# Patient Record
Sex: MALE | Race: WHITE | NOT HISPANIC OR LATINO | Employment: OTHER | URBAN - METROPOLITAN AREA
[De-identification: names, ages, dates, MRNs, and addresses within clinical notes are randomized per-mention and may not be internally consistent; named-entity substitution may affect disease eponyms.]

---

## 2018-09-18 ENCOUNTER — OUTSIDE FACILITY SERVICE (OUTPATIENT)
Dept: GASTROENTEROLOGY | Facility: CLINIC | Age: 68
End: 2018-09-18

## 2018-09-18 ENCOUNTER — LAB REQUISITION (OUTPATIENT)
Dept: LAB | Facility: HOSPITAL | Age: 68
End: 2018-09-18

## 2018-09-18 DIAGNOSIS — Z12.11 ENCOUNTER FOR SCREENING FOR MALIGNANT NEOPLASM OF COLON: ICD-10-CM

## 2018-09-18 DIAGNOSIS — D12.4 BENIGN NEOPLASM OF DESCENDING COLON: ICD-10-CM

## 2018-09-18 DIAGNOSIS — D12.5 BENIGN NEOPLASM OF SIGMOID COLON: ICD-10-CM

## 2018-09-18 PROCEDURE — 45385 COLONOSCOPY W/LESION REMOVAL: CPT | Performed by: INTERNAL MEDICINE

## 2018-09-18 PROCEDURE — 88305 TISSUE EXAM BY PATHOLOGIST: CPT | Performed by: INTERNAL MEDICINE

## 2018-09-19 LAB
CYTO UR: NORMAL
LAB AP CASE REPORT: NORMAL
LAB AP CLINICAL INFORMATION: NORMAL
LAB AP DIAGNOSIS COMMENT: NORMAL
PATH REPORT.FINAL DX SPEC: NORMAL
PATH REPORT.GROSS SPEC: NORMAL

## 2018-10-01 ENCOUNTER — OFFICE VISIT (OUTPATIENT)
Dept: ORTHOPEDIC SURGERY | Facility: CLINIC | Age: 68
End: 2018-10-01

## 2018-10-01 VITALS — OXYGEN SATURATION: 98 % | HEIGHT: 69 IN | BODY MASS INDEX: 26.81 KG/M2 | WEIGHT: 181 LBS | HEART RATE: 70 BPM

## 2018-10-01 DIAGNOSIS — M79.672 LEFT FOOT PAIN: Primary | ICD-10-CM

## 2018-10-01 DIAGNOSIS — M21.6X2 ACQUIRED METATARSUS VARUS OF LEFT FOOT: ICD-10-CM

## 2018-10-01 DIAGNOSIS — G80.1 SPASTIC DIPLEGIA (HCC): ICD-10-CM

## 2018-10-01 DIAGNOSIS — M79.671 RIGHT FOOT PAIN: ICD-10-CM

## 2018-10-01 DIAGNOSIS — G62.9 NEUROPATHY: ICD-10-CM

## 2018-10-01 DIAGNOSIS — M24.575 CONTRACTURE OF JOINT OF FOOT, LEFT: ICD-10-CM

## 2018-10-01 PROCEDURE — 99204 OFFICE O/P NEW MOD 45 MIN: CPT | Performed by: ORTHOPAEDIC SURGERY

## 2018-10-01 RX ORDER — ROSUVASTATIN CALCIUM 5 MG/1
TABLET, COATED ORAL
COMMUNITY

## 2018-10-01 RX ORDER — LISINOPRIL 10 MG/1
10 TABLET ORAL DAILY
Refills: 0 | COMMUNITY
Start: 2018-08-07

## 2018-10-01 RX ORDER — UBIDECARENONE 100 MG
100 CAPSULE ORAL DAILY
COMMUNITY

## 2018-10-01 RX ORDER — LORATADINE 10 MG/1
10 TABLET ORAL DAILY
COMMUNITY
End: 2021-07-20

## 2018-10-01 NOTE — PROGRESS NOTES
NEW PATIENT    Patient: Ion Borges  : 1950    Primary Care Provider: Aurora Morales MD    Requesting Provider: As above    Pain of the Left Foot      History    Chief Complaint: Left foot pain    History of Present Illness: This is a 67-year-old gentleman who I last saw in .  He is here with increased pain in the left forefoot.  He has a complex neurologic history.  It's unclear however as 2 exactly the source of his problem.  He is a somewhat vague historian.  Sometime in the , he had surgery on his neck at C3 and C4.  He has an abnormal gait and spasticity lower greater than upper.  He is not certain if it started at the time of surgery, or if it got better after surgery, or if it stayed the same.  He does not think he has cerebral palsy.  He has not seen a neurologist in many years.  He is right-hand dominant, his hands and upper extremities have much more normal function on the lower extremities.  However he is hyperreflexic throughout.  He is here today because he has increased pain in the left foot.  He has a bunion and significant hammertoes 2 and 3.  He reports pain in all shoes.  He has difficulty finding anything to wear.  He rates pain as 2-6 out of 10, sharp and aching.  He has tried orthotics, he is tried toe spacers, he has tried various devices.    Current Outpatient Prescriptions on File Prior to Visit   Medication Sig Dispense Refill   • Sod Picosulfate-Mag Ox-Cit Acd 10-3.5-12 MG-GM -GM/160ML solution Take 1 kit by mouth Take As Directed. Follow instructions mailed to your home. If you didn't receive  Instructions, call  [phone]. 2 bottle 0     No current facility-administered medications on file prior to visit.       Allergies   Allergen Reactions   • Sulfur Other (See Comments)     Dizziness at age 13      History reviewed. No pertinent past medical history.  Past Surgical History:   Procedure Laterality Date   • BACK SURGERY     • HERNIA REPAIR      x2   • KNEE  "SURGERY Right     Meniscal sx   • SHOULDER SURGERY Left     Rotator Cuff     Family History   Problem Relation Age of Onset   • Stroke Father       Social History     Social History   • Marital status: Single     Spouse name: N/A   • Number of children: N/A   • Years of education: N/A     Occupational History   • Not on file.     Social History Main Topics   • Smoking status: Former Smoker     Packs/day: 2.00     Years: 11.00     Types: Cigarettes, Pipe     Start date: 1968     Quit date: 1980   • Smokeless tobacco: Never Used   • Alcohol use Yes      Comment: 0-3 daily   • Drug use: No   • Sexual activity: Defer     Other Topics Concern   • Not on file     Social History Narrative   • No narrative on file        Review of Systems   Constitutional: Negative.    HENT: Negative.    Eyes: Negative.    Respiratory: Negative.    Cardiovascular: Negative.    Gastrointestinal: Negative.    Endocrine: Negative.    Genitourinary: Negative.    Musculoskeletal: Positive for arthralgias and neck pain.   Skin: Negative.    Allergic/Immunologic: Positive for environmental allergies.   Neurological: Negative.    Hematological: Negative.    Psychiatric/Behavioral: Negative.        The following portions of the patient's history were reviewed and updated as appropriate: allergies, current medications, past family history, past medical history, past social history, past surgical history and problem list.    Physical Exam:   Pulse 70   Ht 174.6 cm (68.75\")   Wt 82.1 kg (181 lb)   SpO2 98%   BMI 26.92 kg/m²   GENERAL: Body habitus: overweight    Lower extremity edema: Left: trace; Right: trace    Varicose veins:  Left: mild; Right: mild    Gait: He walks with a somewhat crouched gait, left a little bit worse than right.  He has some flexion at the left knee and tends to slide that leg forward.  He does not have a heel toe gait on the left.  It's a little bit better on the right.     Mental Status:  awake and alert; oriented to " person, place, and time    Voice:  clear  SKIN:  warm and dry    Hair Growth:  Right:diminished; Left:  diminished  NAILS: Toenails: thick  HEENT: Head: Normocephalic, atraumatic,  without obvious abnormality.  eye: normal external eye, no icterus  ears: normal external ears  nose: normal external nose  pharynx: dental hygiene adequate  PULM:  Repiratory effort normal  CV:  Dorsalis Pedis:  Right: 2+; Left:2+    Posterior Tibial: Right:2+; Left:2+    Capillary Refill:  Brisk  MSK:  Hand:right handed and intrinsic wasting      Tibia:  Right:  non tender; Left:  non tender      Ankle:  Right: non tender and ROM  normal; Left:  non tender and He has less voluntary dorsiflexion on the left, he is able to dorsiflex it but tends to sit in an equina varus posture.      Foot:  Right:  Much milder hallux valgus metatarsus primus varus and hammertoes; Left:  Severe left hallux valgus metatarsus primus varus and crossover second and third toes.  The second and third toes have 90° flexion contractures at the PIP joints, they are dorsally dislocated at the metatarsal phalangeal joints and not reducible, they are tender to palpation.  Less deformity of toes 4 and 5.  Moderately tender over the bunion.  Fairly significant pronation of the great toe.      NEURO: Heel Walking:  Right:  Unable to do this; Left:  Unable to do this    Toe Walking:  Right:  Unable to do this; Left:  Unable to do this     [place] 5.07 monofilament test: patchy decrease    Lower extremity sensation: diminished     Reflexes:  Biceps:  Right:  4+; Left:  4+           Quads:  Right:  4+; Left:  4+           Ankle:  Right:  3+; Left:  3+      Calf Atrophy:none    Motor Function: He has better motor control in the right leg compared with the left, he has some trouble with voluntary dorsiflexion on the left, but I don't think it's due to weakness I think rather it's due to spasticity.  He has about 10-12 beats of clonus in both feet with dorsiflexion  of the ankles..         Medical Decision Making    Data Review:   ordered and reviewed x-rays today and phone conversation with physician    Assessment and Plan/ Diagnosis/Treatment options:   1. Contracture of joint of foot, left  He had increasing deformity in the left foot.  What I would do surgically is fuse the great toes stiff and straight, shortening the second and third metatarsals, excise the PIP joints, do metatarsal capsulotomies and extensor tendon lengthening, an open flexor tenotomies.  He will need to be nonweightbearing 10-12 weeks.  I explained to him the goal is stiffer straighter toes.  I cannot make them normal.  The goal is less pain.  However given that he has not had a neurologic evaluation in many years, and we do not know the source of his hyperreflexive nose, spasticity, clonus I think he needs to see a neurologist before we make a surgical plan.  He and I discussed this in detail.  He asked that Dr. Morales make the arrangements.  I spoke to her by phone and she will do that.  I will see him back when that evaluation is complete    2. Acquired metatarsus varus of left foot  As above    3. Spastic diplegia (CMS/HCC)/neuropathy  Significant neurologic abnormality, it looks like spastic diplegia, but it also could be some other upper motor neuron problem.  I will see him back when the neurologic evaluation is complete.

## 2018-10-22 ENCOUNTER — OFFICE VISIT (OUTPATIENT)
Dept: NEUROLOGY | Facility: CLINIC | Age: 68
End: 2018-10-22

## 2018-10-22 ENCOUNTER — LAB (OUTPATIENT)
Dept: LAB | Facility: HOSPITAL | Age: 68
End: 2018-10-22

## 2018-10-22 VITALS
OXYGEN SATURATION: 98 % | SYSTOLIC BLOOD PRESSURE: 122 MMHG | WEIGHT: 181 LBS | DIASTOLIC BLOOD PRESSURE: 78 MMHG | HEIGHT: 69 IN | BODY MASS INDEX: 26.81 KG/M2 | HEART RATE: 78 BPM

## 2018-10-22 DIAGNOSIS — G62.9 NEUROPATHY: ICD-10-CM

## 2018-10-22 DIAGNOSIS — R29.2 HYPERREFLEXIA OF LOWER EXTREMITY: Primary | ICD-10-CM

## 2018-10-22 DIAGNOSIS — D49.7 NEOPLASM OF UNSPECIFIED BEHAVIOR OF ENDOCRINE GLANDS AND OTHER PARTS OF NERVOUS SYSTEM: ICD-10-CM

## 2018-10-22 LAB
CK SERPL-CCNC: 195 U/L (ref 26–174)
FOLATE SERPL-MCNC: 5.83 NG/ML (ref 3.2–20)
HBA1C MFR BLD: 5.5 % (ref 4.8–5.6)
T4 FREE SERPL-MCNC: 1.24 NG/DL (ref 0.89–1.76)
TSH SERPL DL<=0.05 MIU/L-ACNC: 1.96 MIU/ML (ref 0.35–5.35)
VIT B12 BLD-MCNC: 270 PG/ML (ref 211–911)

## 2018-10-22 PROCEDURE — 84155 ASSAY OF PROTEIN SERUM: CPT

## 2018-10-22 PROCEDURE — 84165 PROTEIN E-PHORESIS SERUM: CPT

## 2018-10-22 PROCEDURE — 86038 ANTINUCLEAR ANTIBODIES: CPT

## 2018-10-22 PROCEDURE — 82550 ASSAY OF CK (CPK): CPT

## 2018-10-22 PROCEDURE — 83036 HEMOGLOBIN GLYCOSYLATED A1C: CPT

## 2018-10-22 PROCEDURE — 84439 ASSAY OF FREE THYROXINE: CPT

## 2018-10-22 PROCEDURE — 82607 VITAMIN B-12: CPT

## 2018-10-22 PROCEDURE — 84207 ASSAY OF VITAMIN B-6: CPT

## 2018-10-22 PROCEDURE — 82746 ASSAY OF FOLIC ACID SERUM: CPT

## 2018-10-22 PROCEDURE — 36415 COLL VENOUS BLD VENIPUNCTURE: CPT

## 2018-10-22 PROCEDURE — 84443 ASSAY THYROID STIM HORMONE: CPT

## 2018-10-22 PROCEDURE — 99204 OFFICE O/P NEW MOD 45 MIN: CPT | Performed by: PSYCHIATRY & NEUROLOGY

## 2018-10-22 RX ORDER — GUAIFENESIN 600 MG/1
1200 TABLET, EXTENDED RELEASE ORAL 2 TIMES DAILY
COMMUNITY
End: 2020-09-14

## 2018-10-22 NOTE — PROGRESS NOTES
Subjective:    CC: Ion Borges is seen today in consultation at the request of Aurora Morales MD for Spasticity       HPI:  68-year-old male with a history of hypertension, hyperlipidemia, recently diagnosed prostate cancer, cervical laminectomy was referred here by orthopedics for hyperreflexia on examination.  As per patient he had symptoms of tingling in both his arms along with dragging of his left leg and inability to have a penile erection back in 1980s.  After that he had an MRI of his cervical spine that had showed diffused vertebrae in his neck.  He postponed having surgery for several years but finally had a laminectomy at C3-4, C4-5 in 1990s.  After the surgery the tingling in his arms improved however he continued to drag his left leg and have sexual dysfunction.  He also started to have low back pain off and on about 10 years ago but he denies any radiation of the pain down his legs.  Denies any tingling or numbness in his feet but does feel extremely cold in his hands and feet and also has pain in the bottom of his feet on both sides that worsens with prolonged standing.  He was diagnosed with plantar fasciitis and was fitted with special insoles which relieved his pain initially but that has restarted again now.  He also has hammertoes and bunions in his left foot and recently saw an orthopedic surgeon who felt he was a good surgical candidate however since he had hyperreflexia in his lower extremities on examination  she referred him here.  He denies having a history of diabetes but does drink alcohol every day for the past several years (either 2 glasses of wine or 2-3 beers every day).    The following portions of the patient's history were reviewed today and updated as of 10/22/2018  : allergies, current medications, past family history, past medical history, past social history, past surgical history and problem list  These document will be scanned to patient's chart.      Current Outpatient  Prescriptions:   •  ALBUTEROL SULFATE IN, Inhale As Needed., Disp: , Rfl:   •  Ascorbic Acid (VITAMIN C PO), Take  by mouth Daily., Disp: , Rfl:   •  CBD (cannabidiol) oral oil, Take  by mouth Daily., Disp: , Rfl:   •  Cholecalciferol (VITAMIN D3 PO), Take  by mouth Daily., Disp: , Rfl:   •  CLOTRIMAZOLE EX, Apply  topically., Disp: , Rfl:   •  coenzyme Q10 100 MG capsule, Take 100 mg by mouth Daily., Disp: , Rfl:   •  guaiFENesin (MUCINEX) 600 MG 12 hr tablet, Take 1,200 mg by mouth 2 (Two) Times a Day., Disp: , Rfl:   •  Hyaluronan (ORTHOVISC) 30 MG/2ML solution prefilled syringe injection, Inject  into the appropriate joint as directed by provider 2 (Two) Times a Day., Disp: , Rfl:   •  lisinopril (PRINIVIL,ZESTRIL) 10 MG tablet, Take 10 mg by mouth Daily., Disp: , Rfl: 0  •  loratadine (CLARITIN) 10 MG tablet, Take 10 mg by mouth Daily., Disp: , Rfl:   •  Multiple Vitamin (MULTI-VITAMIN DAILY PO), Take  by mouth., Disp: , Rfl:   •  Omega-3 Fatty Acids (FISH OIL PO), Take  by mouth Daily., Disp: , Rfl:   •  rosuvastatin (CRESTOR) 5 MG tablet, rosuvastatin 5 mg tablet  Take 1 tablet every day by oral route., Disp: , Rfl:   •  Sod Picosulfate-Mag Ox-Cit Acd 10-3.5-12 MG-GM -GM/160ML solution, Take 1 kit by mouth Take As Directed. Follow instructions mailed to your home. If you didn't receive  Instructions, call  [phone]., Disp: 2 bottle, Rfl: 0   History reviewed. No pertinent past medical history.   Past Surgical History:   Procedure Laterality Date   • BACK SURGERY     • HERNIA REPAIR      x2   • KNEE SURGERY Right     Meniscal sx   • SHOULDER SURGERY Left     Rotator Cuff      Family History   Problem Relation Age of Onset   • Stroke Father       Social History     Social History   • Marital status: Single     Spouse name: N/A   • Number of children: N/A   • Years of education: N/A     Occupational History   • Not on file.     Social History Main Topics   • Smoking status: Former Smoker     Packs/day:  "2.00     Years: 11.00     Types: Cigarettes, Pipe     Start date: 1968     Quit date: 1980   • Smokeless tobacco: Never Used   • Alcohol use Yes      Comment: 0-3 daily   • Drug use: No   • Sexual activity: Defer     Other Topics Concern   • Not on file     Social History Narrative   • No narrative on file     Review of Systems   Respiratory: Positive for cough and shortness of breath.    Genitourinary: Positive for erectile dysfunction.   Musculoskeletal: Positive for gait problem, neck pain and neck stiffness.   Neurological: Positive for speech difficulty and weakness.   All other systems reviewed and are negative.      Objective:    /78 (BP Location: Right arm, Patient Position: Sitting, Cuff Size: Adult)   Pulse 78   Ht 174.6 cm (68.75\")   Wt 82.1 kg (181 lb)   SpO2 98%   BMI 26.92 kg/m²     Neurology Exam:    General apperance: NAD.     Mental status: Alert, awake and oriented to time place and person.    Recent and Remote memory: Intact.    Attention span and Concentration: Normal.     Language and Speech: Intact- No dysarthria.    Fluency, Naming , Repitition and Comprehension:  Intact    Cranial Nerves:   CN II: Visual fields are full. Intact. Fundi - Normal, No papillederma, Pupils - CHAIM  CN III, IV and VI: Extraocular movements are intact. Normal saccades.   CN V: Facial sensation is intact.   CN VII: Muscles of facial expression reveal no asymmetry. Intact.   CN VIII: Hearing is intact. Whispered voice intact.   CN IX and X: Palate elevates symmetrically. Intact  CN XI: Shoulder shrug is intact.   CN XII: Tongue is midline without evidence of atrophy or fasciculation.     Ophthalmoscopic exam of optic disc-normal    Motor:  Right UE muscle strength 5/5. Normal tone.     Left UE muscle strength 5/5. Normal tone.      Right LE muscle strength5/5. Normal tone.     Left LE muscle strength 5/5. Normal tone.  Left foot -bunion and hammer toes present    Sensory: Normal light touch and pinprick " sensation bilaterally.  Impaired vibration in both upper and lower extremities    DTRs: 2+ bilaterally in upper and 3+ in b/l lower extremities.  No clonus appreciated today     Babinski: Negative bilaterally.    Co-ordination: Normal finger-to-nose, heel to shin B/L.    Rhomberg: Negative.    Gait:  He did drag his left leg a little .  His left foot was also plantarflexed while walking    Cardiovascular: Regular rate and rhythm without murmur, gallop or rub.    Assessment and Plan:  1. Neuropathy  Based on his symptoms I feel he could have peripheral neuropathy secondary to long-standing daily alcohol use along with possible tarsal tunnel syndrome.  I will get an EMG/NCS to diagnose the above.  I will also get a neuropathy panel.  We will defer starting him on medications for now  - TONY; Future  - Hemoglobin A1c; Future  - Protein Elec + Interp, Serum; Future  - TSH+Free T4; Future  - Vitamin B12 & Folate; Future  - Vitamin B6; Future  - CK; Future  - EMG & Nerve Conduction Test; Future    2. Hyperreflexia of lower extremity  The hyperreflexia in his lower extremities could be long-standing changes in his cervical spine.  However there could be a concomitant lumbar spine pathology as he does complain of low back pain off and on.  I will get an MRI L spine as he has never had imaging of the lower back even when he had symptoms several years ago of the left leg dracking with penile dysfunction.  - MRI Lumbar Spine Without Contrast; Future       Return in about 5 weeks (around 11/26/2018).         Brook Huang MD

## 2018-10-23 LAB
ALBUMIN SERPL-MCNC: 3.9 G/DL (ref 2.9–4.4)
ALBUMIN/GLOB SERPL: 1.3 {RATIO} (ref 0.7–1.7)
ALPHA1 GLOB FLD ELPH-MCNC: 0.2 G/DL (ref 0–0.4)
ALPHA2 GLOB SERPL ELPH-MCNC: 0.8 G/DL (ref 0.4–1)
ANA SER QL: NEGATIVE
B-GLOBULIN SERPL ELPH-MCNC: 1.1 G/DL (ref 0.7–1.3)
GAMMA GLOB SERPL ELPH-MCNC: 0.8 G/DL (ref 0.4–1.8)
GLOBULIN SER CALC-MCNC: 2.9 G/DL (ref 2.2–3.9)
Lab: NORMAL
M-SPIKE: NORMAL G/DL
PROT PATTERN SERPL ELPH-IMP: NORMAL
PROT SERPL-MCNC: 6.8 G/DL (ref 6–8.5)

## 2018-10-25 LAB — VIT B6 SERPL-MCNC: 6.2 UG/L (ref 5.3–46.7)

## 2018-10-26 ENCOUNTER — TELEPHONE (OUTPATIENT)
Dept: NEUROLOGY | Facility: CLINIC | Age: 68
End: 2018-10-26

## 2018-10-26 NOTE — TELEPHONE ENCOUNTER
Informed patient of results per . Stated verbal understanding. Inquired about his EMG. No auth required per his insurance and CS should be calling soon to schedule this but provided pt with their phone # as well to get that set up.

## 2018-10-26 NOTE — TELEPHONE ENCOUNTER
----- Message from Brook Huang MD sent at 10/25/2018  4:54 PM EDT -----  Can you tell him that both his vitamin B6 and his B12 are on the lower limit of normal.  Hence I want him to take vitamin B6 100 mg daily and vitamin B12 1000 µg daily.  Other labs were fine.  His muscle enzyme was only slightly elevated.  Nothing needs to be done about that

## 2018-11-12 ENCOUNTER — HOSPITAL ENCOUNTER (OUTPATIENT)
Dept: MRI IMAGING | Facility: HOSPITAL | Age: 68
Discharge: HOME | End: 2018-11-12
Attending: PSYCHIATRY & NEUROLOGY | Admitting: PSYCHIATRY & NEUROLOGY

## 2018-11-12 DIAGNOSIS — R29.2 HYPERREFLEXIA OF LOWER EXTREMITY: ICD-10-CM

## 2018-11-12 PROCEDURE — 72148 MRI LUMBAR SPINE W/O DYE: CPT

## 2018-11-14 ENCOUNTER — TELEPHONE (OUTPATIENT)
Dept: NEUROLOGY | Facility: CLINIC | Age: 68
End: 2018-11-14

## 2018-11-27 ENCOUNTER — OFFICE VISIT (OUTPATIENT)
Dept: NEUROLOGY | Facility: CLINIC | Age: 68
End: 2018-11-27

## 2018-11-27 ENCOUNTER — HOSPITAL ENCOUNTER (OUTPATIENT)
Dept: NEUROLOGY | Facility: HOSPITAL | Age: 68
Discharge: HOME | End: 2018-11-27
Attending: PSYCHIATRY & NEUROLOGY | Admitting: PSYCHIATRY & NEUROLOGY

## 2018-11-27 VITALS
DIASTOLIC BLOOD PRESSURE: 76 MMHG | HEIGHT: 69 IN | WEIGHT: 181 LBS | BODY MASS INDEX: 26.81 KG/M2 | SYSTOLIC BLOOD PRESSURE: 120 MMHG

## 2018-11-27 DIAGNOSIS — G62.9 NEUROPATHY: ICD-10-CM

## 2018-11-27 DIAGNOSIS — G62.9 NEUROPATHY: Primary | ICD-10-CM

## 2018-11-27 PROCEDURE — 95886 MUSC TEST DONE W/N TEST COMP: CPT

## 2018-11-27 PROCEDURE — 95913 NRV CNDJ TEST 13/> STUDIES: CPT

## 2018-11-27 PROCEDURE — 99214 OFFICE O/P EST MOD 30 MIN: CPT | Performed by: PSYCHIATRY & NEUROLOGY

## 2018-11-27 RX ORDER — GABAPENTIN 300 MG/1
CAPSULE ORAL
Qty: 60 CAPSULE | Refills: 3 | Status: SHIPPED | OUTPATIENT
Start: 2018-11-27 | End: 2019-02-05 | Stop reason: SDUPTHER

## 2018-11-27 NOTE — PROGRESS NOTES
Subjective:    CC: Ion Borges is seen today  for hyperreflexia       HPI:  Current visit-Since his last visit patient continues to do the same and is currently denying any back pain radiating down to the legs.  He does have pain at the bottom of his feet more so with prolonged standing that is not well controlled with CBD oil.  He had all of his workup including the neuropathy panel showed that his B6 and B12 was on the lower limit of normal, other labs were normal.  I had asked him to take supplements which she is taking.  He also had his MRI L spine that I personally reviewed the results of which is as follows -Grade 1 anterolisthesis of L5 on S1. Multilevel spondylitic changes greatest at the L4-L5 and L5-S1 levels. At the L4-L5 level, there is right far lateral predominance of disc bulge along with significant facet hypertrophy producing moderate to severe spinal canal stenosis greatest in the right lateral region with moderate to severe right neuroforaminal stenosis and subarticular recess narrowing. At the L5-S1 level, there is moderate right and moderate to severe left neuroforaminal stenosis and left subarticular recess narrowing.  His EMG/NCS showed mild axonal peripheral neuropathy as well as multiple entrapment neuropathies including moderate left peroneal, mild to moderate bilateral ulnar and mild right median neuropathy.  As such there was no evidence of L5-S1 radiculopathy as the proximally innervated muscles in the legs were normal.  I have discussed the results of the MRI in detail with the patient however I feel he would not be a surgical candidate at this time as his back pain is mild with no radiation to the legs.  I did offer him a physical therapy referral but he has  been going for acupuncture for cervical radiculopathy and would like to try that first before trying PT.    Initial [id number] male with a history of hypertension, hyperlipidemia, recently diagnosed prostate cancer,  cervical laminectomy was referred here by orthopedics for hyperreflexia on examination.  As per patient he had symptoms of tingling in both his arms along with dragging of his left leg and inability to have a penile erection back in 1980s.  After that he had an MRI of his cervical spine that had showed diffused vertebrae in his neck.  He postponed having surgery for several years but finally had a laminectomy at C3-4, C4-5 in 1990s.  After the surgery the tingling in his arms improved however he continued to drag his left leg and have sexual dysfunction.  He also started to have low back pain off and on about 10 years ago but he denies any radiation of the pain down his legs.  Denies any tingling or numbness in his feet but does feel extremely cold in his hands and feet and also has pain in the bottom of his feet on both sides that worsens with prolonged standing.  He was diagnosed with plantar fasciitis and was fitted with special insoles which relieved his pain initially but that has restarted again now.  He also has hammertoes and bunions in his left foot and recently saw an orthopedic surgeon who felt he was a good surgical candidate however since he had hyperreflexia in his lower extremities on examination  she referred him here.  He denies having a history of diabetes but does drink alcohol every day for the past several years (either 2 glasses of wine or 2-3 beers every day).    The following portions of the patient's history were reviewed today and updated as of 11/27/2018  : allergies, current medications, past family history, past medical history, past social history, past surgical history and problem list  These document will be scanned to patient's chart.      Current Outpatient Medications:   •  ALBUTEROL SULFATE IN, Inhale As Needed., Disp: , Rfl:   •  Ascorbic Acid (VITAMIN C PO), Take  by mouth Daily., Disp: , Rfl:   •  CBD (cannabidiol) oral oil, Take  by mouth Daily., Disp: , Rfl:   •   Cholecalciferol (VITAMIN D3 PO), Take  by mouth Daily., Disp: , Rfl:   •  CLOTRIMAZOLE EX, Apply  topically., Disp: , Rfl:   •  coenzyme Q10 100 MG capsule, Take 100 mg by mouth Daily., Disp: , Rfl:   •  guaiFENesin (MUCINEX) 600 MG 12 hr tablet, Take 1,200 mg by mouth 2 (Two) Times a Day., Disp: , Rfl:   •  Hyaluronan (ORTHOVISC) 30 MG/2ML solution prefilled syringe injection, Inject  into the appropriate joint as directed by provider 2 (Two) Times a Day., Disp: , Rfl:   •  lisinopril (PRINIVIL,ZESTRIL) 10 MG tablet, Take 10 mg by mouth Daily., Disp: , Rfl: 0  •  loratadine (CLARITIN) 10 MG tablet, Take 10 mg by mouth Daily., Disp: , Rfl:   •  Multiple Vitamin (MULTI-VITAMIN DAILY PO), Take  by mouth., Disp: , Rfl:   •  Omega-3 Fatty Acids (FISH OIL PO), Take  by mouth Daily., Disp: , Rfl:   •  rosuvastatin (CRESTOR) 5 MG tablet, rosuvastatin 5 mg tablet  Take 1 tablet every day by oral route., Disp: , Rfl:   •  Sod Picosulfate-Mag Ox-Cit Acd 10-3.5-12 MG-GM -GM/160ML solution, Take 1 kit by mouth Take As Directed. Follow instructions mailed to your home. If you didn't receive  Instructions, call  [phone]., Disp: 2 bottle, Rfl: 0  •  gabapentin (NEURONTIN) 300 MG capsule, Take one capsule at night for 1 week then increase to 1 capsule twice a day thereafter, Disp: 60 capsule, Rfl: 3   History reviewed. No pertinent past medical history.   Past Surgical History:   Procedure Laterality Date   • BACK SURGERY     • HERNIA REPAIR      x2   • KNEE SURGERY Right     Meniscal sx   • SHOULDER SURGERY Left     Rotator Cuff      Family History   Problem Relation Age of Onset   • Stroke Father       Social History     Socioeconomic History   • Marital status:      Spouse name: Not on file   • Number of children: Not on file   • Years of education: Not on file   • Highest education level: Not on file   Social Needs   • Financial resource strain: Not on file   • Food insecurity - worry: Not on file   • Food  "insecurity - inability: Not on file   • Transportation needs - medical: Not on file   • Transportation needs - non-medical: Not on file   Occupational History   • Not on file   Tobacco Use   • Smoking status: Former Smoker     Packs/day: 2.00     Years: 11.00     Pack years: 22.00     Types: Cigarettes, Pipe     Start date:      Last attempt to quit: 1980     Years since quittin.9   • Smokeless tobacco: Never Used   Substance and Sexual Activity   • Alcohol use: Yes     Comment: 0-3 daily   • Drug use: No   • Sexual activity: Defer   Other Topics Concern   • Not on file   Social History Narrative   • Not on file     Review of Systems   Genitourinary: Positive for erectile dysfunction.   Musculoskeletal: Positive for arthralgias, back pain, gait problem, joint swelling, neck pain and neck stiffness.   All other systems reviewed and are negative.      Objective:    /76 (BP Location: Right arm, Patient Position: Sitting, Cuff Size: Adult)   Ht 174.6 cm (68.75\")   Wt 82.1 kg (181 lb)   BMI 26.92 kg/m²     Neurology Exam:    General apperance: NAD.     Mental status: Alert, awake and oriented to time place and person.    Recent and Remote memory: Intact.    Attention span and Concentration: Normal.     Language and Speech: Intact- No dysarthria.    Fluency, Naming , Repitition and Comprehension:  Intact    Cranial Nerves:   CN II: Visual fields are full. Intact. Fundi - Normal, No papillederma, Pupils - CHAIM  CN III, IV and VI: Extraocular movements are intact. Normal saccades.   CN V: Facial sensation is intact.   CN VII: Muscles of facial expression reveal no asymmetry. Intact.   CN VIII: Hearing is intact. Whispered voice intact.   CN IX and X: Palate elevates symmetrically. Intact  CN XI: Shoulder shrug is intact.   CN XII: Tongue is midline without evidence of atrophy or fasciculation.     Ophthalmoscopic exam of optic disc-normal    Motor:  Right UE muscle strength 5/5. Normal tone.     Left UE " muscle strength 5/5. Normal tone.      Right LE muscle strength5/5. Normal tone.     Left LE muscle strength 5/5.  Plantarflexion and dorsiflexion-5/5, Eversion and inversion-4/5, Normal tone.  Left foot -bunion and hammer toes present    Sensory: Normal light touch and pinprick sensation bilaterally.  Impaired vibration in both upper and lower extremities    DTRs: 2+ bilaterally in upper and 3+ in b/l lower extremities.  No clonus appreciated today     Babinski: Negative bilaterally.    Co-ordination: Normal finger-to-nose, heel to shin B/L.    Rhomberg: Negative.    Gait:  He did drag his left leg a little .  His left foot was also plantarflexed while walking  Cardiovascular: Regular rate and rhythm without murmur, gallop or rub.    Assessment and Plan:  1. Neuropathy  Patient has mild peripheral neuropathy most likely due to chronic alcohol use in addition to multiple entrapment neuropathies.  I have again counseled him to cut down on his alcohol intake.  I have also asked him to wear bilateral elbow braces especially at night.  I feel the left moderate peroneal neuropathy is chronic.  I will start him on gabapentin gradually increasing to 300 mg twice a day for the pain in his feet.   Regarding his MRI lumbar spine findings patient will let me know if he needs a physical therapy referral.  I do not think he will be a surgical candidate at this time as he denies having any severe symptoms.    Return in about 2 months (around 1/27/2019).     I spent over 20 minutes with the patient face to face out of which over 50% (10 minutes) was spent in management including reviewing his MRI and labs, instructions and education.     Brook Huang MD

## 2018-12-04 ENCOUNTER — APPOINTMENT (OUTPATIENT)
Dept: NEUROLOGY | Facility: HOSPITAL | Age: 68
End: 2018-12-04
Attending: PSYCHIATRY & NEUROLOGY

## 2019-01-02 ENCOUNTER — OFFICE VISIT (OUTPATIENT)
Dept: ORTHOPEDIC SURGERY | Facility: CLINIC | Age: 69
End: 2019-01-02

## 2019-01-02 VITALS — BODY MASS INDEX: 27.1 KG/M2 | HEIGHT: 69 IN | WEIGHT: 182.98 LBS | HEART RATE: 85 BPM | OXYGEN SATURATION: 98 %

## 2019-01-02 DIAGNOSIS — M21.962 DEFORMITY OF LEFT FOOT: Primary | ICD-10-CM

## 2019-01-02 PROCEDURE — 99214 OFFICE O/P EST MOD 30 MIN: CPT | Performed by: ORTHOPAEDIC SURGERY

## 2019-01-02 RX ORDER — CLOTRIMAZOLE 1 %
CREAM (GRAM) TOPICAL
Refills: 3 | COMMUNITY
Start: 2018-11-19 | End: 2020-04-23

## 2019-01-02 NOTE — PROGRESS NOTES
ESTABLISHED PATIENT    Patient: Ion Borges  : 1950    Primary Care Provider: Aurora Morales MD    Requesting Provider: As above    Follow-up of the Left Foot (Contracture of Joint of Foot Left/3 month f/u)      History    Chief Complaint: Left foot pain and deformity    History of Present Illness: He returns for follow-up of his neurologic evaluation.  The picture still is not very clear.  The EMGs showed neuropathy bilaterally, with more peroneal on the left.  Also some ulnar neuropathy.  I looked over the EMGs/NCB, as well as the neurology notes.  They did not identify the clonus that I found, and indicated motor function was 5 out of 5 which is not consistent with what I found.  In any event, they did not identify any progressive neurologic abnormality.  He would like to proceed with correction of the forefoot because of the severe deformity and pain     Current Outpatient Medications on File Prior to Visit   Medication Sig Dispense Refill   • ALBUTEROL SULFATE IN Inhale As Needed.     • Ascorbic Acid (VITAMIN C PO) Take  by mouth Daily.     • CBD (cannabidiol) oral oil Take  by mouth Daily.     • Cholecalciferol (VITAMIN D3 PO) Take  by mouth Daily.     • clotrimazole (LOTRIMIN) 1 % cream RAUL TO SOLES OF FEET AND WEBSPACES BID  FOR 2 WEEKS  3   • CLOTRIMAZOLE EX Apply  topically.     • coenzyme Q10 100 MG capsule Take 100 mg by mouth Daily.     • gabapentin (NEURONTIN) 300 MG capsule Take one capsule at night for 1 week then increase to 1 capsule twice a day thereafter 60 capsule 3   • guaiFENesin (MUCINEX) 600 MG 12 hr tablet Take 1,200 mg by mouth 2 (Two) Times a Day.     • Hyaluronan (ORTHOVISC) 30 MG/2ML solution prefilled syringe injection Inject  into the appropriate joint as directed by provider 2 (Two) Times a Day.     • lisinopril (PRINIVIL,ZESTRIL) 10 MG tablet Take 10 mg by mouth Daily.  0   • loratadine (CLARITIN) 10 MG tablet Take 10 mg by mouth Daily.     • Multiple Vitamin  (MULTI-VITAMIN DAILY PO) Take  by mouth.     • Omega-3 Fatty Acids (FISH OIL PO) Take  by mouth Daily.     • rosuvastatin (CRESTOR) 5 MG tablet rosuvastatin 5 mg tablet   Take 1 tablet every day by oral route.     • Sod Picosulfate-Mag Ox-Cit Acd 10-3.5-12 MG-GM -GM/160ML solution Take 1 kit by mouth Take As Directed. Follow instructions mailed to your home. If you didn't receive  Instructions, call  [phone]. 2 bottle 0     No current facility-administered medications on file prior to visit.       Allergies   Allergen Reactions   • Sulfur Other (See Comments)     Dizziness at age 13      History reviewed. No pertinent past medical history.  Past Surgical History:   Procedure Laterality Date   • BACK SURGERY     • HERNIA REPAIR      x2   • KNEE SURGERY Right     Meniscal sx   • SHOULDER SURGERY Left     Rotator Cuff     Family History   Problem Relation Age of Onset   • Stroke Father       Social History     Socioeconomic History   • Marital status:      Spouse name: Not on file   • Number of children: Not on file   • Years of education: Not on file   • Highest education level: Not on file   Social Needs   • Financial resource strain: Not on file   • Food insecurity - worry: Not on file   • Food insecurity - inability: Not on file   • Transportation needs - medical: Not on file   • Transportation needs - non-medical: Not on file   Occupational History   • Not on file   Tobacco Use   • Smoking status: Former Smoker     Packs/day: 2.00     Years: 11.00     Pack years: 22.00     Types: Cigarettes, Pipe     Start date:      Last attempt to quit: 1980     Years since quittin.0   • Smokeless tobacco: Never Used   Substance and Sexual Activity   • Alcohol use: Yes     Comment: 0-3 daily   • Drug use: No   • Sexual activity: Defer   Other Topics Concern   • Not on file   Social History Narrative   • Not on file        Review of Systems   Constitutional: Negative.    HENT: Negative.    Eyes: Negative.   "  Respiratory: Negative.    Cardiovascular: Negative.    Gastrointestinal: Negative.    Endocrine: Negative.    Genitourinary: Negative.    Musculoskeletal: Positive for joint swelling.   Skin: Negative.    Allergic/Immunologic: Negative.    Neurological: Negative.    Hematological: Negative.    Psychiatric/Behavioral: Negative.        The following portions of the patient's history were reviewed and updated as appropriate: allergies, current medications, past family history, past medical history, past social history, past surgical history and problem list.    Physical Exam:   Pulse 85   Ht 174.6 cm (68.74\")   Wt 83 kg (182 lb 15.7 oz)   SpO2 98%   BMI 27.23 kg/m²   GENERAL: Body habitus: overweight    Lower extremity edema: Left: trace; Right: trace    Gait: No change in his slightly crouched gait, sliding the foot on the left     Mental Status:  awake and alert; oriented to person, place, and time  MSK:  ; Left:  No change in severe left foot deformity with dorsally dislocated second and third metatarsal phalangeal joints, crossover toes, severe hallux valgus metatarsus primus varus, tender in toes 1 through 4, tender over the bunion, quite rigid deformity, fifth toe has reasonable alignment and is not tender    NEURO Sensation:  globally diminished,       Medical Decision Making    Data Review:   reviewed outside records    Assessment/Plan/Diagnosis/Treatment Options:   1. Deformity of left foot  The neurologic evaluations really did not clear up the picture completely.  However there is no obvious progressive neurologic problem.  Therefore I think it's reasonable to correct the foot.  I again explained to him what I would do.  I would fuse the first metatarsal phalangeal joint, do osteotomies of the second and third metatarsals.  For toes 2, 3, 4 I would do PIP joint excisions, metatarsal capsulotomy with extensor tendon lengthenings, and flexor tenotomies.  He will be nonweightbearing 12 weeks postop.  We " discussed using a knee scooter.  I recommend he practiced preoperatively.  After the 12 weeks he will be walking in a tall boot for 6-8 weeks.  I explained the  surgery.  I explained the stiffness in the toes, patients usually walk well after the surgery but they cannot go on tip-toe, crouch, wear heels, and they go down stairs flat-footed.  At most, they will be able to wear a 1 inch wedge heel.  The goal of the surgery is a stiffer straighter toes with less pain.  Most patients report about 80% reduction in pain.      I explained the permanent plate and screws, and the temporary pins and how I remove them in the office.   I explained that all foot and ankle surgery swells far longer than any other surgery because it is below the heart.     I explained the post-op regimen: 6-8 weeks non-weight bearing in a tall boot, then 6-8 weeks walking in a boot, with swelling it may be 4-5 months to get a shoe back on.  I explained it really takes a full year to see the end results of the surgery.    The risks were discussed including but not limited to: death, infection, neuro-vascular damage, stroke, heart attacks, blood clots, amputation, chronic pain, mal-union, non-union, hardware failure, deformity, need for further surgery, etc. Questions were asked and answered in detail.   We also discussed what would happen if we do nothing.     - Case Request; Standing  - ceFAZolin (ANCEF) 2 g in Sodium chloride 0.9 % 100 mL IVPB; Infuse 2 g into a venous catheter 1 (One) Time.  - CBC and Differential; Future  - Basic metabolic panel; Future  - Hemoglobin A1c; Future  - ECG 12 Lead; Future  - Case Request

## 2019-01-29 ENCOUNTER — OFFICE VISIT (OUTPATIENT)
Dept: NEUROLOGY | Facility: CLINIC | Age: 69
End: 2019-01-29

## 2019-01-29 VITALS
BODY MASS INDEX: 26.96 KG/M2 | SYSTOLIC BLOOD PRESSURE: 122 MMHG | OXYGEN SATURATION: 98 % | HEART RATE: 63 BPM | DIASTOLIC BLOOD PRESSURE: 74 MMHG | HEIGHT: 69 IN | WEIGHT: 182 LBS

## 2019-01-29 DIAGNOSIS — G62.9 NEUROPATHY: Primary | ICD-10-CM

## 2019-01-29 DIAGNOSIS — M54.50 CHRONIC BILATERAL LOW BACK PAIN WITHOUT SCIATICA: ICD-10-CM

## 2019-01-29 DIAGNOSIS — G89.29 CHRONIC BILATERAL LOW BACK PAIN WITHOUT SCIATICA: ICD-10-CM

## 2019-01-29 DIAGNOSIS — G56.23 ULNAR NEUROPATHY OF BOTH UPPER EXTREMITIES: ICD-10-CM

## 2019-01-29 PROBLEM — G56.20 ULNAR NEUROPATHY: Status: ACTIVE | Noted: 2019-01-29

## 2019-01-29 PROCEDURE — 99213 OFFICE O/P EST LOW 20 MIN: CPT | Performed by: PSYCHIATRY & NEUROLOGY

## 2019-01-29 NOTE — PROGRESS NOTES
Subjective:    CC: Ion Borges is seen today  for Peripheral Neuropathy       HPI:  Current visit-patient feels that the pain in his feet has improved after starting gabapentin 300 mg twice a day.  He does not want to go up on the dose any further yet.  He still has not had the surgery for his bunions and hammertoes and will be having it next month.  He continues to have mild low back pain and stiffness but no pain radiating down his legs.  With regards to his ulnar neuropathy he still has not started wearing an elbow brace.    Last visit- Since his last visit patient continues to do the same and is currently denying any back pain radiating down to the legs.  He does have pain at the bottom of his feet more so with prolonged standing that is not well controlled with CBD oil.  He had all of his workup including the neuropathy panel showed that his B6 and B12 was on the lower limit of normal, other labs were normal.  I had asked him to take supplements which she is taking.  He also had his MRI L spine that I personally reviewed the results of which is as follows -Grade 1 anterolisthesis of L5 on S1. Multilevel spondylitic changes greatest at the L4-L5 and L5-S1 levels. At the L4-L5 level, there is right far lateral predominance of disc bulge along with significant facet hypertrophy producing moderate to severe spinal canal stenosis greatest in the right lateral region with moderate to severe right neuroforaminal stenosis and subarticular recess narrowing. At the L5-S1 level, there is moderate right and moderate to severe left neuroforaminal stenosis and left subarticular recess narrowing.  His EMG/NCS showed mild axonal peripheral neuropathy as well as multiple entrapment neuropathies including moderate left peroneal, mild to moderate bilateral ulnar and mild right median neuropathy.  As such there was no evidence of L5-S1 radiculopathy as the proximally innervated muscles in the legs were normal.  I have  discussed the results of the MRI in detail with the patient however I feel he would not be a surgical candidate at this time as his back pain is mild with no radiation to the legs.  I did offer him a physical therapy referral but he has  been going for acupuncture for cervical radiculopathy and would like to try that first before trying PT.     Initial [id number]-old male with a history of hypertension, hyperlipidemia, recently diagnosed prostate cancer, cervical laminectomy was referred here by orthopedics for hyperreflexia on examination.  As per patient he had symptoms of tingling in both his arms along with dragging of his left leg and inability to have a penile erection back in 1980s.  After that he had an MRI of his cervical spine that had showed diffused vertebrae in his neck.  He postponed having surgery for several years but finally had a laminectomy at C3-4, C4-5 in 1990s.  After the surgery the tingling in his arms improved however he continued to drag his left leg and have sexual dysfunction.  He also started to have low back pain off and on about 10 years ago but he denies any radiation of the pain down his legs.  Denies any tingling or numbness in his feet but does feel extremely cold in his hands and feet and also has pain in the bottom of his feet on both sides that worsens with prolonged standing.  He was diagnosed with plantar fasciitis and was fitted with special insoles which relieved his pain initially but that has restarted again now.  He also has hammertoes and bunions in his left foot and recently saw an orthopedic surgeon who felt he was a good surgical candidate however since he had hyperreflexia in his lower extremities on examination  she referred him here.  He denies having a history of diabetes but does drink alcohol every day for the past several years (either 2 glasses of wine or 2-3 beers every day).    The following portions of the patient's history were reviewed today and  updated as of 01/29/2019  : allergies, current medications, past family history, past medical history, past social history, past surgical history and problem list  These document will be scanned to patient's chart.      Current Outpatient Medications:   •  ALBUTEROL SULFATE IN, Inhale As Needed., Disp: , Rfl:   •  Ascorbic Acid (VITAMIN C PO), Take  by mouth Daily., Disp: , Rfl:   •  CBD (cannabidiol) oral oil, Take  by mouth Daily., Disp: , Rfl:   •  Cholecalciferol (VITAMIN D3 PO), Take  by mouth Daily., Disp: , Rfl:   •  clotrimazole (LOTRIMIN) 1 % cream, RAUL TO SOLES OF FEET AND WEBSPACES BID  FOR 2 WEEKS, Disp: , Rfl: 3  •  CLOTRIMAZOLE EX, Apply  topically., Disp: , Rfl:   •  coenzyme Q10 100 MG capsule, Take 100 mg by mouth Daily., Disp: , Rfl:   •  gabapentin (NEURONTIN) 300 MG capsule, Take one capsule at night for 1 week then increase to 1 capsule twice a day thereafter, Disp: 60 capsule, Rfl: 3  •  guaiFENesin (MUCINEX) 600 MG 12 hr tablet, Take 1,200 mg by mouth 2 (Two) Times a Day., Disp: , Rfl:   •  Hyaluronan (ORTHOVISC) 30 MG/2ML solution prefilled syringe injection, Inject  into the appropriate joint as directed by provider 2 (Two) Times a Day., Disp: , Rfl:   •  lisinopril (PRINIVIL,ZESTRIL) 10 MG tablet, Take 10 mg by mouth Daily., Disp: , Rfl: 0  •  loratadine (CLARITIN) 10 MG tablet, Take 10 mg by mouth Daily., Disp: , Rfl:   •  Multiple Vitamin (MULTI-VITAMIN DAILY PO), Take  by mouth., Disp: , Rfl:   •  Omega-3 Fatty Acids (FISH OIL PO), Take  by mouth Daily., Disp: , Rfl:   •  rosuvastatin (CRESTOR) 5 MG tablet, rosuvastatin 5 mg tablet  Take 1 tablet every day by oral route., Disp: , Rfl:   •  Sod Picosulfate-Mag Ox-Cit Acd 10-3.5-12 MG-GM -GM/160ML solution, Take 1 kit by mouth Take As Directed. Follow instructions mailed to your home. If you didn't receive  Instructions, call  [phone]., Disp: 2 bottle, Rfl: 0   History reviewed. No pertinent past medical history.   Past Surgical  "History:   Procedure Laterality Date   • BACK SURGERY     • HERNIA REPAIR      x2   • KNEE SURGERY Right     Meniscal sx   • SHOULDER SURGERY Left     Rotator Cuff      Family History   Problem Relation Age of Onset   • Stroke Father       Social History     Socioeconomic History   • Marital status:      Spouse name: Not on file   • Number of children: Not on file   • Years of education: Not on file   • Highest education level: Not on file   Social Needs   • Financial resource strain: Not on file   • Food insecurity - worry: Not on file   • Food insecurity - inability: Not on file   • Transportation needs - medical: Not on file   • Transportation needs - non-medical: Not on file   Occupational History   • Not on file   Tobacco Use   • Smoking status: Former Smoker     Packs/day: 2.00     Years: 11.00     Pack years: 22.00     Types: Cigarettes, Pipe     Start date:      Last attempt to quit:      Years since quittin.1   • Smokeless tobacco: Never Used   Substance and Sexual Activity   • Alcohol use: Yes     Comment: 0-3 daily   • Drug use: No   • Sexual activity: Defer   Other Topics Concern   • Not on file   Social History Narrative   • Not on file     Review of Systems   HENT: Positive for rhinorrhea.    Respiratory: Positive for cough.    Musculoskeletal: Positive for gait problem, neck pain and neck stiffness.   Neurological: Positive for numbness.   All other systems reviewed and are negative.      Objective:    /74 (BP Location: Right arm, Patient Position: Sitting, Cuff Size: Adult)   Pulse 63   Ht 174.6 cm (68.75\")   Wt 82.6 kg (182 lb)   SpO2 98%   BMI 27.07 kg/m²     Neurology Exam:    General apperance: NAD.     Mental status: Alert, awake and oriented to time place and person.    Recent and Remote memory: Intact.    Attention span and Concentration: Normal.     Language and Speech: Intact- No dysarthria.    Fluency, Naming , Repitition and Comprehension:  Intact    Cranial " Nerves:   CN II: Visual fields are full. Intact. Fundi - Normal, No papillederma, Pupils - CHAIM  CN III, IV and VI: Extraocular movements are intact. Normal saccades.   CN V: Facial sensation is intact.   CN VII: Muscles of facial expression reveal no asymmetry. Intact.   CN VIII: Hearing is intact. Whispered voice intact.   CN IX and X: Palate elevates symmetrically. Intact  CN XI: Shoulder shrug is intact.   CN XII: Tongue is midline without evidence of atrophy or fasciculation.     Ophthalmoscopic exam of optic disc-normal    Motor:  Right UE muscle strength 5/5. Normal tone.     Left UE muscle strength 5/5. Normal tone.      Right LE muscle strength5/5. Normal tone.       Left LE muscle strength 5/5.  Plantarflexion and dorsiflexion-5/5, Eversion and inversion-4/5, Normal tone.  Left foot -bunion and hammer toes present    Sensory: Normal light touch and pinprick sensation bilaterally.  Impaired vibration in both upper and lower extremities    DTRs: 2+ bilaterally in upper and 3+ in b/l lower extremities.  No clonus appreciated today     Babinski: Negative bilaterally.    Co-ordination: Normal finger-to-nose, heel to shin B/L.    Rhomberg: Negative.    Gait:  He did drag his left leg a little .  His left foot was also plantarflexed while walking    Cardiovascular: Regular rate and rhythm without murmur, gallop or rub.    Assessment and Plan:  1. Neuropathy  Peripheral neuropathy most likely due to chronic alcohol use.  He has cut down his intake to no more than once a week.  He also has multiple entrapment neuropathies including bilateral ulnar, left peroneal and right median neuropathy.  I have asked him to continue gabapentin 300 mg twice a day for now    2. Chronic bilateral low back pain without sciatica  He will get physical therapy for this as advised.  If needed I will give him a referral    3. Ulnar neuropathy of both upper extremities  I have again advised him to wear bilateral elbow splints especially  at night       Return in about 4 months (around 5/29/2019).         Brook Huang MD

## 2019-02-05 RX ORDER — GABAPENTIN 300 MG/1
CAPSULE ORAL
Qty: 180 CAPSULE | Refills: 1 | Status: SHIPPED | OUTPATIENT
Start: 2019-02-05 | End: 2019-05-14 | Stop reason: SDUPTHER

## 2019-02-18 ENCOUNTER — TELEPHONE (OUTPATIENT)
Dept: ORTHOPEDIC SURGERY | Facility: CLINIC | Age: 69
End: 2019-02-18

## 2019-02-18 NOTE — TELEPHONE ENCOUNTER
PATIENT CALLED TO CANCEL SURGERY ON 4/30 WITH DR. IZAGUIRRE.  STATED HE HAS DECIDED TO GO IN ANOTHER DIRECTION.

## 2019-05-14 ENCOUNTER — OFFICE VISIT (OUTPATIENT)
Dept: NEUROLOGY | Facility: CLINIC | Age: 69
End: 2019-05-14

## 2019-05-14 VITALS
HEART RATE: 74 BPM | SYSTOLIC BLOOD PRESSURE: 124 MMHG | BODY MASS INDEX: 26.96 KG/M2 | HEIGHT: 69 IN | DIASTOLIC BLOOD PRESSURE: 76 MMHG | OXYGEN SATURATION: 98 % | WEIGHT: 182 LBS

## 2019-05-14 DIAGNOSIS — M54.50 CHRONIC BILATERAL LOW BACK PAIN WITHOUT SCIATICA: ICD-10-CM

## 2019-05-14 DIAGNOSIS — G56.23 ULNAR NEUROPATHY OF BOTH UPPER EXTREMITIES: ICD-10-CM

## 2019-05-14 DIAGNOSIS — G89.29 CHRONIC BILATERAL LOW BACK PAIN WITHOUT SCIATICA: ICD-10-CM

## 2019-05-14 DIAGNOSIS — G62.9 NEUROPATHY: Primary | ICD-10-CM

## 2019-05-14 PROCEDURE — 99214 OFFICE O/P EST MOD 30 MIN: CPT | Performed by: PSYCHIATRY & NEUROLOGY

## 2019-05-14 RX ORDER — GABAPENTIN 300 MG/1
CAPSULE ORAL
Qty: 90 CAPSULE | Refills: 5 | Status: SHIPPED | OUTPATIENT
Start: 2019-05-14 | End: 2019-09-26

## 2019-05-14 NOTE — PROGRESS NOTES
Subjective:    CC: Ion Borges is seen today  for Peripheral Neuropathy       HPI:  Current visit- since his last visit patient has had surgery for his hammertoes but not for the bunions.  The pain in his feet has improved but he still gets burning pain in the balls of his feet with prolonged standing at work.  His low back pain has also improved as he has been getting physical therapy.  With regards to his ulnar neuropathy he typically wears the elbow splints only on one side.    patient feels that the pain in his feet has improved after starting gabapentin 300 mg twice a day.  He does not want to go up on the dose any further yet.  He still has not had the surgery for his bunions and hammertoes and will be having it next month.  He continues to have mild low back pain and stiffness but no pain radiating down his legs.  With regards to his ulnar neuropathy he still has not started wearing an elbow brace.     Last visit- Since his last visit patient continues to do the same and is currently denying any back pain radiating down to the legs.  He does have pain at the bottom of his feet more so with prolonged standing that is not well controlled with CBD oil.  He had all of his workup including the neuropathy panel showed that his B6 and B12 was on the lower limit of normal, other labs were normal.  I had asked him to take supplements which she is taking.  He also had his MRI L spine that I personally reviewed the results of which is as follows -Grade 1 anterolisthesis of L5 on S1. Multilevel spondylitic changes greatest at the L4-L5 and L5-S1 levels. At the L4-L5 level, there is right far lateral predominance of disc bulge along with significant facet hypertrophy producing moderate to severe spinal canal stenosis greatest in the right lateral region with moderate to severe right neuroforaminal stenosis and subarticular recess narrowing. At the L5-S1 level, there is moderate right and moderate to severe left  neuroforaminal stenosis and left subarticular recess narrowing.  His EMG/NCS showed mild axonal peripheral neuropathy as well as multiple entrapment neuropathies including moderate left peroneal, mild to moderate bilateral ulnar and mild right median neuropathy.  As such there was no evidence of L5-S1 radiculopathy as the proximally innervated muscles in the legs were normal.  I have discussed the results of the MRI in detail with the patient however I feel he would not be a surgical candidate at this time as his back pain is mild with no radiation to the legs.  I did offer him a physical therapy referral but he has  been going for acupuncture for cervical radiculopathy and would like to try that first before trying PT.     Initial [id number]-old male with a history of hypertension, hyperlipidemia, recently diagnosed prostate cancer, cervical laminectomy was referred here by orthopedics for hyperreflexia on examination.  As per patient he had symptoms of tingling in both his arms along with dragging of his left leg and inability to have a penile erection back in 1980s.  After that he had an MRI of his cervical spine that had showed diffused vertebrae in his neck.  He postponed having surgery for several years but finally had a laminectomy at C3-4, C4-5 in 1990s.  After the surgery the tingling in his arms improved however he continued to drag his left leg and have sexual dysfunction.  He also started to have low back pain off and on about 10 years ago but he denies any radiation of the pain down his legs.  Denies any tingling or numbness in his feet but does feel extremely cold in his hands and feet and also has pain in the bottom of his feet on both sides that worsens with prolonged standing.  He was diagnosed with plantar fasciitis and was fitted with special insoles which relieved his pain initially but that has restarted again now.  He also has hammertoes and bunions in his left foot and recently saw an  orthopedic surgeon who felt he was a good surgical candidate however since he had hyperreflexia in his lower extremities on examination  she referred him here.  He denies having a history of diabetes but does drink alcohol every day for the past several years (either 2 glasses of wine or 2-3 beers every day).    The following portions of the patient's history were reviewed today and updated as of 05/14/2019  : allergies, current medications, past family history, past medical history, past social history, past surgical history and problem list  These document will be scanned to patient's chart.      Current Outpatient Medications:   •  ALBUTEROL SULFATE IN, Inhale As Needed., Disp: , Rfl:   •  Ascorbic Acid (VITAMIN C PO), Take  by mouth Daily., Disp: , Rfl:   •  CBD (cannabidiol) oral oil, Take  by mouth Daily., Disp: , Rfl:   •  Cholecalciferol (VITAMIN D3 PO), Take  by mouth Daily., Disp: , Rfl:   •  clotrimazole (LOTRIMIN) 1 % cream, RAUL TO SOLES OF FEET AND WEBSPACES BID  FOR 2 WEEKS, Disp: , Rfl: 3  •  CLOTRIMAZOLE EX, Apply  topically., Disp: , Rfl:   •  coenzyme Q10 100 MG capsule, Take 100 mg by mouth Daily., Disp: , Rfl:   •  gabapentin (NEURONTIN) 300 MG capsule, Take one capsule three times a day, Disp: 90 capsule, Rfl: 5  •  guaiFENesin (MUCINEX) 600 MG 12 hr tablet, Take 1,200 mg by mouth 2 (Two) Times a Day., Disp: , Rfl:   •  Hyaluronan (ORTHOVISC) 30 MG/2ML solution prefilled syringe injection, Inject  into the appropriate joint as directed by provider 2 (Two) Times a Day., Disp: , Rfl:   •  lisinopril (PRINIVIL,ZESTRIL) 10 MG tablet, Take 10 mg by mouth Daily., Disp: , Rfl: 0  •  loratadine (CLARITIN) 10 MG tablet, Take 10 mg by mouth Daily., Disp: , Rfl:   •  Multiple Vitamin (MULTI-VITAMIN DAILY PO), Take  by mouth., Disp: , Rfl:   •  Omega-3 Fatty Acids (FISH OIL PO), Take  by mouth Daily., Disp: , Rfl:   •  rosuvastatin (CRESTOR) 5 MG tablet, rosuvastatin 5 mg tablet  Take 1 tablet every day by  "oral route., Disp: , Rfl:   •  Sod Picosulfate-Mag Ox-Cit Acd 10-3.5-12 MG-GM -GM/160ML solution, Take 1 kit by mouth Take As Directed. Follow instructions mailed to your home. If you didn't receive  Instructions, call  [phone]., Disp: 2 bottle, Rfl: 0   History reviewed. No pertinent past medical history.   Past Surgical History:   Procedure Laterality Date   • BACK SURGERY     • FOOT SURGERY      toe removed on left foot    • HERNIA REPAIR      x2   • KNEE SURGERY Right     Meniscal sx   • SHOULDER SURGERY Left     Rotator Cuff      Family History   Problem Relation Age of Onset   • Stroke Father       Social History     Socioeconomic History   • Marital status:      Spouse name: Not on file   • Number of children: Not on file   • Years of education: Not on file   • Highest education level: Not on file   Tobacco Use   • Smoking status: Former Smoker     Packs/day: 2.00     Years: 11.00     Pack years: 22.00     Types: Cigarettes, Pipe     Start date:      Last attempt to quit:      Years since quittin.3   • Smokeless tobacco: Never Used   Substance and Sexual Activity   • Alcohol use: Yes     Comment: 0-3 daily   • Drug use: No   • Sexual activity: Defer     Review of Systems   Eyes: Positive for discharge.   Musculoskeletal: Positive for neck pain and neck stiffness.   All other systems reviewed and are negative.      Objective:    /76 (BP Location: Right arm, Patient Position: Sitting, Cuff Size: Adult)   Pulse 74   Ht 174.6 cm (68.75\")   Wt 82.6 kg (182 lb)   SpO2 98%   BMI 27.07 kg/m²     Neurology Exam:    General apperance: NAD.     Mental status: Alert, awake and oriented to time place and person.    Recent and Remote memory: Intact.    Attention span and Concentration: Normal.     Language and Speech: Intact- No dysarthria.    Fluency, Naming , Repitition and Comprehension:  Intact    Cranial Nerves:   CN II: Visual fields are full. Intact. Fundi - Normal, No " papillederma, Pupils - CHAIM  CN III, IV and VI: Extraocular movements are intact. Normal saccades.   CN V: Facial sensation is intact.   CN VII: Muscles of facial expression reveal no asymmetry. Intact.   CN VIII: Hearing is intact. Whispered voice intact.   CN IX and X: Palate elevates symmetrically. Intact  CN XI: Shoulder shrug is intact.   CN XII: Tongue is midline without evidence of atrophy or fasciculation.     Ophthalmoscopic exam of optic disc-normal    Motor:  Right UE muscle strength 5/5. Normal tone.     Left UE muscle strength 5/5. Normal tone.      Right LE muscle strength5/5. Normal tone.     Left LE muscle strength 5/5.  Plantarflexion and dorsiflexion-5/5, Eversion and inversion-4/5, Normal tone.      Sensory: Normal light touch and pinprick sensation bilaterally.  Impaired vibration in both upper and lower extremities    DTRs: 2+ bilaterally in upper and 3+ in b/l lower extremities.  No clonus     Babinski: Negative bilaterally.    Co-ordination: Normal finger-to-nose, heel to shin B/L.    Rhomberg: Negative.    Gait:  He did drag his left leg a little .  His left foot was also plantarflexed while walking      Cardiovascular: Regular rate and rhythm without murmur, gallop or rub.    Assessment and Plan:  1. Neuropathy  Could be from chronic alcohol use which he has now cut down on along with the deformities in his foot.  He also has multiple entrapment neuropathies including bilateral ulnar, left peroneal and right median neuropathy.  I will increase his gabapentin to 300 mg 3 times a day    2. Chronic bilateral low back pain without sciatica  Well-controlled with physical therapy    3. Ulnar neuropathy of both upper extremities  I have again counseled him to wear bilateral elbow splints       Return in about 5 months (around 10/14/2019).     I spent over 25  minutes with the patient face to face out of which over 50% (15 minutes) was spent in management, instructions and education regarding his  ulnar neuropathy.     Brook Huang MD

## 2019-07-16 ENCOUNTER — HOSPITAL ENCOUNTER (OUTPATIENT)
Dept: GENERAL RADIOLOGY | Facility: HOSPITAL | Age: 69
Discharge: HOME | End: 2019-07-16
Admitting: INTERNAL MEDICINE

## 2019-07-16 ENCOUNTER — TRANSCRIBE ORDERS (OUTPATIENT)
Dept: ADMINISTRATIVE | Facility: HOSPITAL | Age: 69
End: 2019-07-16

## 2019-07-16 DIAGNOSIS — R52 PAIN: Primary | ICD-10-CM

## 2019-07-16 DIAGNOSIS — T14.90XA TRAUMA: ICD-10-CM

## 2019-07-16 PROCEDURE — 73560 X-RAY EXAM OF KNEE 1 OR 2: CPT

## 2019-08-13 ENCOUNTER — TRANSCRIBE ORDERS (OUTPATIENT)
Dept: ADMINISTRATIVE | Facility: HOSPITAL | Age: 69
End: 2019-08-13

## 2019-08-13 DIAGNOSIS — E21.3 HYPERPARATHYROIDISM (HCC): Primary | ICD-10-CM

## 2019-09-24 ENCOUNTER — OFFICE VISIT (OUTPATIENT)
Dept: NEUROLOGY | Facility: CLINIC | Age: 69
End: 2019-09-24

## 2019-09-24 ENCOUNTER — TELEPHONE (OUTPATIENT)
Dept: NEUROLOGY | Facility: CLINIC | Age: 69
End: 2019-09-24

## 2019-09-24 VITALS
OXYGEN SATURATION: 99 % | WEIGHT: 183 LBS | HEART RATE: 72 BPM | DIASTOLIC BLOOD PRESSURE: 60 MMHG | BODY MASS INDEX: 27.11 KG/M2 | HEIGHT: 69 IN | SYSTOLIC BLOOD PRESSURE: 108 MMHG

## 2019-09-24 DIAGNOSIS — G89.29 CHRONIC BILATERAL LOW BACK PAIN WITHOUT SCIATICA: ICD-10-CM

## 2019-09-24 DIAGNOSIS — G62.9 NEUROPATHY: Primary | ICD-10-CM

## 2019-09-24 DIAGNOSIS — G56.23 ULNAR NEUROPATHY OF BOTH UPPER EXTREMITIES: ICD-10-CM

## 2019-09-24 DIAGNOSIS — M54.50 CHRONIC BILATERAL LOW BACK PAIN WITHOUT SCIATICA: ICD-10-CM

## 2019-09-24 PROCEDURE — 99213 OFFICE O/P EST LOW 20 MIN: CPT | Performed by: PSYCHIATRY & NEUROLOGY

## 2019-09-24 RX ORDER — ASPIRIN 81 MG/1
81 TABLET ORAL DAILY
COMMUNITY

## 2019-09-25 ENCOUNTER — TELEPHONE (OUTPATIENT)
Dept: NEUROLOGY | Facility: CLINIC | Age: 69
End: 2019-09-25

## 2019-09-25 NOTE — TELEPHONE ENCOUNTER
Can you ask him where he needs the prescription to be sent because at his last appointment he told me that he will let me know

## 2019-09-25 NOTE — TELEPHONE ENCOUNTER
----- Message from Martha Blancas sent at 9/25/2019 10:50 AM EDT -----  Contact: Gonzalo Huang Pt called requesting a refill on RX gabapentin (NEURONTIN) 300 MG .

## 2019-09-26 RX ORDER — GABAPENTIN 300 MG/1
300 CAPSULE ORAL 3 TIMES DAILY
Qty: 90 CAPSULE | Refills: 3 | Status: SHIPPED | OUTPATIENT
Start: 2019-09-26 | End: 2020-02-20 | Stop reason: SDUPTHER

## 2019-10-22 ENCOUNTER — APPOINTMENT (OUTPATIENT)
Dept: BONE DENSITY | Facility: HOSPITAL | Age: 69
End: 2019-10-22

## 2019-12-04 ENCOUNTER — TELEPHONE (OUTPATIENT)
Dept: NEUROLOGY | Facility: CLINIC | Age: 69
End: 2019-12-04

## 2019-12-04 NOTE — TELEPHONE ENCOUNTER
Pt called and stated that his work description is changing and he would now be required to life 40-50 pounds.  He wants a letter that states that he can only lift 20-25 pounds.   Please advise.

## 2019-12-13 ENCOUNTER — TELEPHONE (OUTPATIENT)
Dept: NEUROLOGY | Facility: CLINIC | Age: 69
End: 2019-12-13

## 2019-12-13 NOTE — TELEPHONE ENCOUNTER
----- Message from Tran Lenz, [employer] Rep sent at 12/13/2019 11:28 AM EST -----  Contact: PT  Reina    PT called, was wondering if his work restrictions letter had been mailed out. It looks as though Sada typed and mailed the letter, and followed up with PT.     Please call Ion back: [phone], would like whoever calls to [facility] if he doesn't answer.

## 2019-12-19 NOTE — TELEPHONE ENCOUNTER
Pt left vm asking for a call back. Pt states he never received his letter in the mail, but can come by today around 1 or 1:30, but asks for a call back.

## 2020-02-20 DIAGNOSIS — G62.9 NEUROPATHY: Primary | ICD-10-CM

## 2020-02-21 RX ORDER — GABAPENTIN 300 MG/1
300 CAPSULE ORAL 3 TIMES DAILY
Qty: 90 CAPSULE | Refills: 3 | Status: SHIPPED | OUTPATIENT
Start: 2020-02-21 | End: 2020-07-06 | Stop reason: SDUPTHER

## 2020-04-23 ENCOUNTER — TELEMEDICINE (OUTPATIENT)
Dept: NEUROLOGY | Facility: CLINIC | Age: 70
End: 2020-04-23

## 2020-04-23 DIAGNOSIS — G89.29 CHRONIC BILATERAL LOW BACK PAIN WITHOUT SCIATICA: ICD-10-CM

## 2020-04-23 DIAGNOSIS — G62.9 NEUROPATHY: Primary | ICD-10-CM

## 2020-04-23 DIAGNOSIS — G56.23 ULNAR NEUROPATHY OF BOTH UPPER EXTREMITIES: ICD-10-CM

## 2020-04-23 DIAGNOSIS — M54.50 CHRONIC BILATERAL LOW BACK PAIN WITHOUT SCIATICA: ICD-10-CM

## 2020-04-23 PROCEDURE — 99213 OFFICE O/P EST LOW 20 MIN: CPT | Performed by: PSYCHIATRY & NEUROLOGY

## 2020-04-23 NOTE — PROGRESS NOTES
Subjective:    CC: Ion Borges is seen today via video visit for neuropathy    HPI:  Current visit-  patient states that even now his paresthesias  in the feet are well controlled with gabapentin 300 mg in the morning and 600 mg at night.  He occasionally has a sensation of feeling cold in the tips of his toes and mild pain but denies any severe symptoms.  His back pain is also good by doing exercises at home.  With regards to his ulnar neuropathy he occasionally wears elbow splints and rotates between the left and the right side.    Last visit-patient states his symptoms of burning in the feet are well controlled with gabapentin 300 mg which he takes 1 in the morning and 2 at bedtime.  However according to his [facility] report patient has been taking it infrequently as he last picked up his gabapentin in July.  His back pain is also under good control.  He has finished physical therapy and now does the exercises at home.  With regards to his mild to moderate bilateral ulnar neuropathy patient wears elbow splints at night although not every day.    Last visit- since his last visit patient has had surgery for his hammertoes but not for the bunions.  The pain in his feet has improved but he still gets burning pain in the balls of his feet with prolonged standing at work.  His low back pain has also improved as he has been getting physical therapy.  With regards to his ulnar neuropathy he typically wears the elbow splints only on one side.     Last visit-patient feels that the pain in his feet has improved after starting gabapentin 300 mg twice a day.  He does not want to go up on the dose any further yet.  He still has not had the surgery for his bunions and hammertoes and will be having it next month.  He continues to have mild low back pain and stiffness but no pain radiating down his legs.  With regards to his ulnar neuropathy he still has not started wearing an elbow brace.     Last visit- Since his last visit  patient continues to do the same and is currently denying any back pain radiating down to the legs.  He does have pain at the bottom of his feet more so with prolonged standing that is not well controlled with CBD oil.  He had all of his workup including the neuropathy panel showed that his B6 and B12 was on the lower limit of normal, other labs were normal.  I had asked him to take supplements which she is taking.  He also had his MRI L spine that I personally reviewed the results of which is as follows -Grade 1 anterolisthesis of L5 on S1. Multilevel spondylitic changes greatest at the L4-L5 and L5-S1 levels. At the L4-L5 level, there is right far lateral predominance of disc bulge along with significant facet hypertrophy producing moderate to severe spinal canal stenosis greatest in the right lateral region with moderate to severe right neuroforaminal stenosis and subarticular recess narrowing. At the L5-S1 level, there is moderate right and moderate to severe left neuroforaminal stenosis and left subarticular recess narrowing.  His EMG/NCS showed mild axonal peripheral neuropathy as well as multiple entrapment neuropathies including moderate left peroneal, mild to moderate bilateral ulnar and mild right median neuropathy.  As such there was no evidence of L5-S1 radiculopathy as the proximally innervated muscles in the legs were normal.  I have discussed the results of the MRI in detail with the patient however I feel he would not be a surgical candidate at this time as his back pain is mild with no radiation to the legs.  I did offer him a physical therapy referral but he has  been going for acupuncture for cervical radiculopathy and would like to try that first before trying PT.     Initial [id number]-old male with a history of hypertension, hyperlipidemia, recently diagnosed prostate cancer, cervical laminectomy was referred here by orthopedics for hyperreflexia on examination.  As per patient he had  symptoms of tingling in both his arms along with dragging of his left leg and inability to have a penile erection back in 1980s.  After that he had an MRI of his cervical spine that had showed diffused vertebrae in his neck.  He postponed having surgery for several years but finally had a laminectomy at C3-4, C4-5 in 1990s.  After the surgery the tingling in his arms improved however he continued to drag his left leg and have sexual dysfunction.  He also started to have low back pain off and on about 10 years ago but he denies any radiation of the pain down his legs.  Denies any tingling or numbness in his feet but does feel extremely cold in his hands and feet and also has pain in the bottom of his feet on both sides that worsens with prolonged standing.  He was diagnosed with plantar fasciitis and was fitted with special insoles which relieved his pain initially but that has restarted again now.  He also has hammertoes and bunions in his left foot and recently saw an orthopedic surgeon who felt he was a good surgical candidate however since he had hyperreflexia in his lower extremities on examination  she referred him here.  He denies having a history of diabetes but does drink alcohol every day for the past several years (either 2 glasses of wine or 2-3 beers every day).    The following portions of the patient's history were reviewed today and updated as of 09/24/2019  : allergies, current medications, past family history, past medical history, past social history, past surgical history and problem list  These document will be scanned to patient's chart.      Current Outpatient Medications:   •  HYALURONAN IX, 200 mg 2 (Two) Times a Day., Disp: , Rfl:   •  ALBUTEROL SULFATE IN, Inhale As Needed., Disp: , Rfl:   •  Ascorbic Acid (VITAMIN C PO), Take  by mouth Daily., Disp: , Rfl:   •  aspirin 81 MG EC tablet, Take 81 mg by mouth Daily., Disp: , Rfl:   •  CBD (cannabidiol) oral oil, Take  by mouth Daily., Disp: ,  Rfl:   •  Cholecalciferol (VITAMIN D3 PO), Take  by mouth Daily., Disp: , Rfl:   •  coenzyme Q10 100 MG capsule, Take 100 mg by mouth Daily., Disp: , Rfl:   •  gabapentin (NEURONTIN) 300 MG capsule, Take 1 capsule by mouth 3 (Three) Times a Day., Disp: 90 capsule, Rfl: 3  •  guaiFENesin (MUCINEX) 600 MG 12 hr tablet, Take 1,200 mg by mouth 2 (Two) Times a Day., Disp: , Rfl:   •  lisinopril (PRINIVIL,ZESTRIL) 10 MG tablet, Take 10 mg by mouth Daily., Disp: , Rfl: 0  •  loratadine (CLARITIN) 10 MG tablet, Take 10 mg by mouth Daily., Disp: , Rfl:   •  Multiple Vitamin (MULTI-VITAMIN DAILY PO), Take  by mouth., Disp: , Rfl:   •  Omega-3 Fatty Acids (FISH OIL PO), Take  by mouth Daily., Disp: , Rfl:   •  rosuvastatin (CRESTOR) 5 MG tablet, rosuvastatin 5 mg tablet  Take 1 tablet every day by oral route., Disp: , Rfl:   •  Sod Picosulfate-Mag Ox-Cit Acd 10-3.5-12 MG-GM -GM/160ML solution, Take 1 kit by mouth Take As Directed. Follow instructions mailed to your home. If you didn't receive  Instructions, call  [phone]., Disp: 2 bottle, Rfl: 0   No past medical history on file.   Past Surgical History:   Procedure Laterality Date   • BACK SURGERY     • FOOT SURGERY      toe removed on left foot    • HERNIA REPAIR      x2   • KNEE SURGERY Right     Meniscal sx   • SHOULDER SURGERY Left     Rotator Cuff      Family History   Problem Relation Age of Onset   • Stroke Father       Social History     Socioeconomic History   • Marital status:      Spouse name: Not on file   • Number of children: Not on file   • Years of education: Not on file   • Highest education level: Not on file   Tobacco Use   • Smoking status: Former Smoker     Packs/day: 2.00     Years: 11.00     Pack years: 22.00     Types: Cigarettes, Pipe     Start date:      Last attempt to quit:      Years since quittin.3   • Smokeless tobacco: Never Used   Substance and Sexual Activity   • Alcohol use: Yes     Comment: 0-3 daily   • Drug  use: No   • Sexual activity: Defer     Review of Systems   Musculoskeletal: Positive for gait problem.   All other systems reviewed and are negative.      Objective:    There were no vitals taken for this visit.    Neurology Exam:    General apperance: NAD.     Mental status: Alert, awake and oriented to time place and person.    Recent and Remote memory: Intact.    Attention span and Concentration: Normal.     Language and Speech: Intact- No dysarthria.    Fluency, Naming , Repitition and Comprehension:  Intact    Cranial Nerves:   CN II: Visual fields are full. Intact. Fundi - Normal, No papillederma, Pupils - CHAIM  CN III, IV and VI: Extraocular movements are intact. Normal saccades.   CN V: Facial sensation is intact.   CN VII: Muscles of facial expression reveal no asymmetry. Intact.   CN VIII: Hearing is intact. Whispered voice intact.   CN IX and X: Palate elevates symmetrically. Intact  CN XI: Shoulder shrug is intact.   CN XII: Tongue is midline without evidence of atrophy or fasciculation.     Motor:  Right UE muscle strength - Normal     Left UE muscle strength -Normal     Right LE muscle strength- Normal      Left LE muscle strength normal. Plantarflexion and dorsiflexion-slightly reduced       Gait:  He does drag his left leg a little and his left foot is plantarflexed while walking      Assessment and Plan:  1. Neuropathy  Continue gabapentin 300 mg in the morning and 600 mg at night.     2. Chronic bilateral low back pain without sciatica  Well-controlled with home exercises    3. Ulnar neuropathy of both upper extremities  He should continue to wear elbow splints.  Denies any pain in the arms or numbness in the hands       Return in about 5 months (around 9/23/2020).         Brook Huang MD

## 2020-07-06 DIAGNOSIS — G62.9 NEUROPATHY: ICD-10-CM

## 2020-07-06 RX ORDER — GABAPENTIN 300 MG/1
300 CAPSULE ORAL 3 TIMES DAILY
Qty: 90 CAPSULE | Refills: 3 | Status: SHIPPED | OUTPATIENT
Start: 2020-07-06 | End: 2020-11-13 | Stop reason: SDUPTHER

## 2020-09-14 ENCOUNTER — OFFICE VISIT (OUTPATIENT)
Dept: NEUROLOGY | Facility: CLINIC | Age: 70
End: 2020-09-14

## 2020-09-14 VITALS
HEIGHT: 69 IN | BODY MASS INDEX: 26.39 KG/M2 | OXYGEN SATURATION: 99 % | DIASTOLIC BLOOD PRESSURE: 80 MMHG | WEIGHT: 178.2 LBS | TEMPERATURE: 98 F | HEART RATE: 67 BPM | SYSTOLIC BLOOD PRESSURE: 122 MMHG

## 2020-09-14 DIAGNOSIS — G62.9 NEUROPATHY: Primary | ICD-10-CM

## 2020-09-14 DIAGNOSIS — M54.50 CHRONIC BILATERAL LOW BACK PAIN WITHOUT SCIATICA: ICD-10-CM

## 2020-09-14 DIAGNOSIS — G89.29 CHRONIC BILATERAL LOW BACK PAIN WITHOUT SCIATICA: ICD-10-CM

## 2020-09-14 DIAGNOSIS — G56.23 ULNAR NEUROPATHY OF BOTH UPPER EXTREMITIES: ICD-10-CM

## 2020-09-14 PROCEDURE — 99213 OFFICE O/P EST LOW 20 MIN: CPT | Performed by: PSYCHIATRY & NEUROLOGY

## 2020-09-14 NOTE — PROGRESS NOTES
Subjective:    CC: Ion Borges is seen today for Peripheral Neuropathy       HPI:  Current visit-  patient states that he has again started to have difficulty walking as he is big toe on the left is pushing against his second toe.  He does not want to have any more surgeries on his feet due to the long recovery process.  The balls of his feet continue to hurt however the burning in his feet is well controlled with gabapentin 300 mg in the morning and 600 mg at night.  Currently his back pain is also under good control as he continues to do back exercises at home.  He is still taking vitamin B6 and B12 supplements.  With regards to his ulnar neuropathy he is still occasionally wearing elbow splints.    Last visit-patient states his symptoms of burning in the feet are well controlled with gabapentin 300 mg which he takes 1 in the morning and 2 at bedtime.  However according to his [facility] report patient has been taking it infrequently as he last picked up his gabapentin in July.  His back pain is also under good control.  He has finished physical therapy and now does the exercises at home.  With regards to his mild to moderate bilateral ulnar neuropathy patient wears elbow splints at night although not every day.    Last visit- since his last visit patient has had surgery for his hammertoes but not for the bunions.  The pain in his feet has improved but he still gets burning pain in the balls of his feet with prolonged standing at work.  His low back pain has also improved as he has been getting physical therapy.  With regards to his ulnar neuropathy he typically wears the elbow splints only on one side.     Last visit-patient feels that the pain in his feet has improved after starting gabapentin 300 mg twice a day.  He does not want to go up on the dose any further yet.  He still has not had the surgery for his bunions and hammertoes and will be having it next month.  He continues to have mild low back pain and  stiffness but no pain radiating down his legs.  With regards to his ulnar neuropathy he still has not started wearing an elbow brace.     Last visit- Since his last visit patient continues to do the same and is currently denying any back pain radiating down to the legs.  He does have pain at the bottom of his feet more so with prolonged standing that is not well controlled with CBD oil.  He had all of his workup including the neuropathy panel showed that his B6 and B12 was on the lower limit of normal, other labs were normal.  I had asked him to take supplements which she is taking.  He also had his MRI L spine that I personally reviewed the results of which is as follows -Grade 1 anterolisthesis of L5 on S1. Multilevel spondylitic changes greatest at the L4-L5 and L5-S1 levels. At the L4-L5 level, there is right far lateral predominance of disc bulge along with significant facet hypertrophy producing moderate to severe spinal canal stenosis greatest in the right lateral region with moderate to severe right neuroforaminal stenosis and subarticular recess narrowing. At the L5-S1 level, there is moderate right and moderate to severe left neuroforaminal stenosis and left subarticular recess narrowing.  His EMG/NCS showed mild axonal peripheral neuropathy as well as multiple entrapment neuropathies including moderate left peroneal, mild to moderate bilateral ulnar and mild right median neuropathy.  As such there was no evidence of L5-S1 radiculopathy as the proximally innervated muscles in the legs were normal.  I have discussed the results of the MRI in detail with the patient however I feel he would not be a surgical candidate at this time as his back pain is mild with no radiation to the legs.  I did offer him a physical therapy referral but he has  been going for acupuncture for cervical radiculopathy and would like to try that first before trying PT.     Initial [id number] male with a history of  hypertension, hyperlipidemia, recently diagnosed prostate cancer, cervical laminectomy was referred here by orthopedics for hyperreflexia on examination.  As per patient he had symptoms of tingling in both his arms along with dragging of his left leg and inability to have a penile erection back in 1980s.  After that he had an MRI of his cervical spine that had showed diffused vertebrae in his neck.  He postponed having surgery for several years but finally had a laminectomy at C3-4, C4-5 in 1990s.  After the surgery the tingling in his arms improved however he continued to drag his left leg and have sexual dysfunction.  He also started to have low back pain off and on about 10 years ago but he denies any radiation of the pain down his legs.  Denies any tingling or numbness in his feet but does feel extremely cold in his hands and feet and also has pain in the bottom of his feet on both sides that worsens with prolonged standing.  He was diagnosed with plantar fasciitis and was fitted with special insoles which relieved his pain initially but that has restarted again now.  He also has hammertoes and bunions in his left foot and recently saw an orthopedic surgeon who felt he was a good surgical candidate however since he had hyperreflexia in his lower extremities on examination  she referred him here.  He denies having a history of diabetes but does drink alcohol every day for the past several years (either 2 glasses of wine or 2-3 beers every day).    The following portions of the patient's history were reviewed today and updated as of 09/24/2019  : allergies, current medications, past family history, past medical history, past social history, past surgical history and problem list  These document will be scanned to patient's chart.      Current Outpatient Medications:   •  ALBUTEROL SULFATE IN, Inhale As Needed., Disp: , Rfl:   •  Ascorbic Acid (VITAMIN C PO), Take  by mouth Daily., Disp: , Rfl:   •  aspirin 81 MG  EC tablet, Take 81 mg by mouth Daily., Disp: , Rfl:   •  CBD (cannabidiol) oral oil, Take  by mouth Daily., Disp: , Rfl:   •  Cholecalciferol (VITAMIN D3 PO), Take  by mouth Daily., Disp: , Rfl:   •  coenzyme Q10 100 MG capsule, Take 100 mg by mouth Daily., Disp: , Rfl:   •  gabapentin (Neurontin) 300 MG capsule, Take 1 capsule by mouth 3 (Three) Times a Day., Disp: 90 capsule, Rfl: 3  •  HYALURONAN IX, 200 mg 2 (Two) Times a Day., Disp: , Rfl:   •  lisinopril (PRINIVIL,ZESTRIL) 10 MG tablet, Take 10 mg by mouth Daily., Disp: , Rfl: 0  •  loratadine (CLARITIN) 10 MG tablet, Take 10 mg by mouth Daily., Disp: , Rfl:   •  Multiple Vitamin (MULTI-VITAMIN DAILY PO), Take  by mouth., Disp: , Rfl:   •  Omega-3 Fatty Acids (FISH OIL PO), Take  by mouth Daily., Disp: , Rfl:   •  rosuvastatin (CRESTOR) 5 MG tablet, rosuvastatin 5 mg tablet  Take 1 tablet every day by oral route., Disp: , Rfl:    History reviewed. No pertinent past medical history.   Past Surgical History:   Procedure Laterality Date   • BACK SURGERY     • FOOT SURGERY      toe removed on left foot    • HERNIA REPAIR      x2   • KNEE SURGERY Right     Meniscal sx   • SHOULDER SURGERY Left     Rotator Cuff      Family History   Problem Relation Age of Onset   • Stroke Father       Social History     Socioeconomic History   • Marital status:      Spouse name: Not on file   • Number of children: Not on file   • Years of education: Not on file   • Highest education level: Not on file   Tobacco Use   • Smoking status: Former Smoker     Packs/day: 2.00     Years: 11.00     Pack years: 22.00     Types: Cigarettes, Pipe     Start date:      Quit date:      Years since quittin.7   • Smokeless tobacco: Never Used   Substance and Sexual Activity   • Alcohol use: Yes     Comment: 0-3 daily   • Drug use: No   • Sexual activity: Defer     Review of Systems   Musculoskeletal: Positive for gait problem.   All other systems reviewed and are  "negative.      Objective:    /80   Pulse 67   Temp 98 °F (36.7 °C) (Temporal)   Ht 175.3 cm (69.02\")   Wt 80.8 kg (178 lb 3.2 oz)   SpO2 99%   BMI 26.30 kg/m²     Neurology Exam:    General apperance: NAD.     Mental status: Alert, awake and oriented to time place and person.    Recent and Remote memory: Intact.    Attention span and Concentration: Normal.     Language and Speech: Intact- No dysarthria.    Fluency, Naming , Repitition and Comprehension:  Intact    Cranial Nerves:   CN II: Visual fields are full. Intact. Fundi - Normal, No papillederma, Pupils - CHAIM  CN III, IV and VI: Extraocular movements are intact. Normal saccades.   CN V: Facial sensation is intact.   CN VII: Muscles of facial expression reveal no asymmetry. Intact.   CN VIII: Hearing is intact. Whispered voice intact.   CN IX and X: Palate elevates symmetrically. Intact  CN XI: Shoulder shrug is intact.   CN XII: Tongue is midline without evidence of atrophy or fasciculation.     Ophthalmoscopic exam of optic disc-normal    Motor:  Right UE muscle strength 5/5. Normal tone.     Left UE muscle strength 5/5. Normal tone.      Right LE muscle strength5/5. Normal tone.     Left LE muscle strength 5/5.  Plantarflexion and dorsiflexion-5/5, Eversion and inversion-4/5, Normal tone.      Sensory: Normal light touch and pinprick sensation bilaterally.  Impaired vibration in both upper and lower extremities    DTRs: 2+ bilaterally in upper and 3+ in b/l lower extremities.  No clonus     Babinski: Negative bilaterally.    Co-ordination: Normal finger-to-nose, heel to shin B/L.    Rhomberg: Negative.    Gait:  He did drag his left leg a little .  His left foot was also plantarflexed while walking    Cardiovascular: Regular rate and rhythm without murmur, gallop or rub.\    Assessment and Plan:  1. Neuropathy  Continue gabapentin 300 mg in the morning and 600 mg at night.  He has also cut down on his alcohol intake to 2 glasses of wine 3-4 " times a week  I have told him to discontinue the B6 and B12 supplements as he has been taking them for over 2 years    2. Chronic bilateral low back pain without sciatica  Well-controlled however if it worsens I will give him another PT referral    3. Ulnar neuropathy of both upper extremities  I have counseled him to be compliant with his elbow splints.  EMG/NCS showed mild to moderate ulnar neuropathy bilaterally       Return in about 6 months (around 3/14/2021).         Brook Huang MD

## 2020-11-13 DIAGNOSIS — G62.9 NEUROPATHY: ICD-10-CM

## 2020-11-13 RX ORDER — GABAPENTIN 300 MG/1
300 CAPSULE ORAL 3 TIMES DAILY
Qty: 90 CAPSULE | Refills: 3 | Status: SHIPPED | OUTPATIENT
Start: 2020-11-13 | End: 2021-03-15 | Stop reason: SDUPTHER

## 2021-03-15 ENCOUNTER — OFFICE VISIT (OUTPATIENT)
Dept: NEUROLOGY | Facility: CLINIC | Age: 71
End: 2021-03-15

## 2021-03-15 VITALS
DIASTOLIC BLOOD PRESSURE: 64 MMHG | WEIGHT: 180.2 LBS | SYSTOLIC BLOOD PRESSURE: 110 MMHG | BODY MASS INDEX: 26.69 KG/M2 | HEIGHT: 69 IN | TEMPERATURE: 97.7 F

## 2021-03-15 DIAGNOSIS — G62.9 NEUROPATHY: Primary | ICD-10-CM

## 2021-03-15 DIAGNOSIS — G56.23 ULNAR NEUROPATHY OF BOTH UPPER EXTREMITIES: ICD-10-CM

## 2021-03-15 DIAGNOSIS — G89.29 CHRONIC BILATERAL LOW BACK PAIN WITHOUT SCIATICA: ICD-10-CM

## 2021-03-15 DIAGNOSIS — M54.50 CHRONIC BILATERAL LOW BACK PAIN WITHOUT SCIATICA: ICD-10-CM

## 2021-03-15 PROCEDURE — 99213 OFFICE O/P EST LOW 20 MIN: CPT | Performed by: PSYCHIATRY & NEUROLOGY

## 2021-03-15 RX ORDER — GABAPENTIN 300 MG/1
300 CAPSULE ORAL 3 TIMES DAILY
Qty: 90 CAPSULE | Refills: 3 | Status: SHIPPED | OUTPATIENT
Start: 2021-03-15 | End: 2021-06-24 | Stop reason: SDUPTHER

## 2021-03-15 NOTE — PROGRESS NOTES
Subjective:    CC: Ion Borges is seen today for Peripheral Neuropathy       HPI:  Current visit-patient states that he changed his podiatrist and got new insoles and also got a wart removed which has helped with the pain in his feet.  He also denies having any burning in his feet and only gets the symptoms when it is cold outside.  He continues to take gabapentin 300 mg in the morning and 600 mg at night.  With regards to his low back pain he got physical therapy which has helped.  Has been trying to do the exercises at home now.  For his ulnar neuropathy he has elbow braces but is not very compliant with them.  He does have occasional numbness in his hands.    Last visit-  patient states that he has again started to have difficulty walking as he is big toe on the left is pushing against his second toe.  He does not want to have any more surgeries on his feet due to the long recovery process.  The balls of his feet continue to hurt however the burning in his feet is well controlled with gabapentin 300 mg in the morning and 600 mg at night.  Currently his back pain is also under good control as he continues to do back exercises at home.  He is still taking vitamin B6 and B12 supplements.  With regards to his ulnar neuropathy he is still occasionally wearing elbow splints.    Last visit-patient states his symptoms of burning in the feet are well controlled with gabapentin 300 mg which he takes 1 in the morning and 2 at bedtime.  However according to his [facility] report patient has been taking it infrequently as he last picked up his gabapentin in July.  His back pain is also under good control.  He has finished physical therapy and now does the exercises at home.  With regards to his mild to moderate bilateral ulnar neuropathy patient wears elbow splints at night although not every day.    Last visit- since his last visit patient has had surgery for his hammertoes but not for the bunions.  The pain in his feet  has improved but he still gets burning pain in the balls of his feet with prolonged standing at work.  His low back pain has also improved as he has been getting physical therapy.  With regards to his ulnar neuropathy he typically wears the elbow splints only on one side.     Last visit-patient feels that the pain in his feet has improved after starting gabapentin 300 mg twice a day.  He does not want to go up on the dose any further yet.  He still has not had the surgery for his bunions and hammertoes and will be having it next month.  He continues to have mild low back pain and stiffness but no pain radiating down his legs.  With regards to his ulnar neuropathy he still has not started wearing an elbow brace.     Last visit- Since his last visit patient continues to do the same and is currently denying any back pain radiating down to the legs.  He does have pain at the bottom of his feet more so with prolonged standing that is not well controlled with CBD oil.  He had all of his workup including the neuropathy panel showed that his B6 and B12 was on the lower limit of normal, other labs were normal.  I had asked him to take supplements which she is taking.  He also had his MRI L spine that I personally reviewed the results of which is as follows -Grade 1 anterolisthesis of L5 on S1. Multilevel spondylitic changes greatest at the L4-L5 and L5-S1 levels. At the L4-L5 level, there is right far lateral predominance of disc bulge along with significant facet hypertrophy producing moderate to severe spinal canal stenosis greatest in the right lateral region with moderate to severe right neuroforaminal stenosis and subarticular recess narrowing. At the L5-S1 level, there is moderate right and moderate to severe left neuroforaminal stenosis and left subarticular recess narrowing.  His EMG/NCS showed mild axonal peripheral neuropathy as well as multiple entrapment neuropathies including moderate left peroneal, mild to  moderate bilateral ulnar and mild right median neuropathy.  As such there was no evidence of L5-S1 radiculopathy as the proximally innervated muscles in the legs were normal.  I have discussed the results of the MRI in detail with the patient however I feel he would not be a surgical candidate at this time as his back pain is mild with no radiation to the legs.  I did offer him a physical therapy referral but he has  been going for acupuncture for cervical radiculopathy and would like to try that first before trying PT.     Initial [id number]old male with a history of hypertension, hyperlipidemia, recently diagnosed prostate cancer, cervical laminectomy was referred here by orthopedics for hyperreflexia on examination.  As per patient he had symptoms of tingling in both his arms along with dragging of his left leg and inability to have a penile erection back in 1980s.  After that he had an MRI of his cervical spine that had showed diffused vertebrae in his neck.  He postponed having surgery for several years but finally had a laminectomy at C3-4, C4-5 in 1990s.  After the surgery the tingling in his arms improved however he continued to drag his left leg and have sexual dysfunction.  He also started to have low back pain off and on about 10 years ago but he denies any radiation of the pain down his legs.  Denies any tingling or numbness in his feet but does feel extremely cold in his hands and feet and also has pain in the bottom of his feet on both sides that worsens with prolonged standing.  He was diagnosed with plantar fasciitis and was fitted with special insoles which relieved his pain initially but that has restarted again now.  He also has hammertoes and bunions in his left foot and recently saw an orthopedic surgeon who felt he was a good surgical candidate however since he had hyperreflexia in his lower extremities on examination  she referred him here.  He denies having a history of diabetes but does  drink alcohol every day for the past several years (either 2 glasses of wine or 2-3 beers every day).    The following portions of the patient's history were reviewed today and updated as of 09/24/2019  : allergies, current medications, past family history, past medical history, past social history, past surgical history and problem list  These document will be scanned to patient's chart.      Current Outpatient Medications:   •  ALBUTEROL SULFATE IN, Inhale As Needed., Disp: , Rfl:   •  Ascorbic Acid (VITAMIN C PO), Take  by mouth Daily., Disp: , Rfl:   •  aspirin 81 MG EC tablet, Take 81 mg by mouth Daily., Disp: , Rfl:   •  CBD (cannabidiol) oral oil, Take  by mouth Daily., Disp: , Rfl:   •  Cholecalciferol (VITAMIN D3 PO), Take  by mouth Daily., Disp: , Rfl:   •  coenzyme Q10 100 MG capsule, Take 100 mg by mouth Daily., Disp: , Rfl:   •  gabapentin (Neurontin) 300 MG capsule, Take 1 capsule by mouth 3 (Three) Times a Day., Disp: 90 capsule, Rfl: 3  •  HYALURONAN IX, 200 mg 2 (Two) Times a Day., Disp: , Rfl:   •  lisinopril (PRINIVIL,ZESTRIL) 10 MG tablet, Take 10 mg by mouth Daily., Disp: , Rfl: 0  •  loratadine (CLARITIN) 10 MG tablet, Take 10 mg by mouth Daily., Disp: , Rfl:   •  Multiple Vitamin (MULTI-VITAMIN DAILY PO), Take  by mouth., Disp: , Rfl:   •  Omega-3 Fatty Acids (FISH OIL PO), Take  by mouth Daily., Disp: , Rfl:   •  rosuvastatin (CRESTOR) 5 MG tablet, rosuvastatin 5 mg tablet  Take 1 tablet every day by oral route., Disp: , Rfl:    History reviewed. No pertinent past medical history.   Past Surgical History:   Procedure Laterality Date   • BACK SURGERY     • FOOT SURGERY      toe removed on left foot    • HERNIA REPAIR      x2   • KNEE SURGERY Right     Meniscal sx   • SHOULDER SURGERY Left     Rotator Cuff      Family History   Problem Relation Age of Onset   • Stroke Father       Social History     Socioeconomic History   • Marital status:      Spouse name: Not on file   • Number of  "children: Not on file   • Years of education: Not on file   • Highest education level: Not on file   Tobacco Use   • Smoking status: Former Smoker     Packs/day: 2.00     Years: 11.00     Pack years: 22.00     Types: Cigarettes, Pipe     Start date:      Quit date:      Years since quittin.2   • Smokeless tobacco: Never Used   Substance and Sexual Activity   • Alcohol use: Yes     Comment: 0-3 daily   • Drug use: No   • Sexual activity: Defer     Review of Systems   Musculoskeletal: Positive for gait problem.   All other systems reviewed and are negative.      Objective:    /64   Temp 97.7 °F (36.5 °C)   Ht 175.3 cm (69.02\")   Wt 81.7 kg (180 lb 3.2 oz)   BMI 26.60 kg/m²     Neurology Exam:    General apperance: NAD.     Mental status: Alert, awake and oriented to time place and person.    Recent and Remote memory: Intact.    Attention span and Concentration: Normal.     Language and Speech: Intact- No dysarthria.    Fluency, Naming , Repitition and Comprehension:  Intact    Cranial Nerves:   CN II: Visual fields are full. Intact. Fundi - Normal, No papillederma, Pupils - CHAIM  CN III, IV and VI: Extraocular movements are intact. Normal saccades.   CN V: Facial sensation is intact.   CN VII: Muscles of facial expression reveal no asymmetry. Intact.   CN VIII: Hearing is intact. Whispered voice intact.   CN IX and X: Palate elevates symmetrically. Intact  CN XI: Shoulder shrug is intact.   CN XII: Tongue is midline without evidence of atrophy or fasciculation.     Ophthalmoscopic exam of optic disc-normal    Motor:  Right UE muscle strength 5/5. Normal tone.     Left UE muscle strength 5/5. Normal tone.      Right LE muscle strength5/5. Normal tone.     Left LE muscle strength 5/5.  Plantarflexion and dorsiflexion-5/5, Eversion and inversion-4/5, Normal tone.      Sensory: Normal light touch and pinprick sensation bilaterally.  Impaired vibration in both upper and lower extremities    DTRs: 2+ " bilaterally in upper and 3+ in b/l lower extremities.  No clonus     Babinski: Negative bilaterally.    Co-ordination: Normal finger-to-nose, heel to shin B/L.    Rhomberg: Negative.    Gait:  He did drag his left leg a little .  His left foot was also plantarflexed while walking    Cardiovascular: Regular rate and rhythm without murmur, gallop or rub.    Assessment and Plan:  1. Neuropathy  Continue gabapentin 300 mg in the morning and 600 mg at night.       2. Chronic bilateral low back pain without sciatica  Has improved since getting PT    3. Ulnar neuropathy of both upper extremities  I have once again counseled him to be compliant with his elbow splints.  EMG/NCS showed mild to moderate ulnar neuropathy bilaterally       Return in about 6 months (around 9/15/2021).         Brook Huang MD

## 2021-06-24 DIAGNOSIS — G62.9 NEUROPATHY: ICD-10-CM

## 2021-06-24 NOTE — TELEPHONE ENCOUNTER
Caller: Ion Borges    Relationship: Self    Best call back number: [phone]    Medication needed:   Requested Prescriptions     Pending Prescriptions Disp Refills   • gabapentin (Neurontin) 300 MG capsule 90 capsule 3     Sig: Take 1 capsule by mouth 3 (Three) Times a Day.       When do you need the refill by: IN NEXT 4 DAYS    What additional details did the patient provide when requesting the medication: PATIENT STATES HE NEEDS NEW REFILL. HE HAD LAST HAD REFILLED IN [place]. HE HAS 4 DAYS LEFT. PLEASE ADVISE.     Does the patient have less than a 3 day supply:  [] Yes  [x] No    What is the patient's preferred pharmacy: [id number] - [place], [place] - [address] . [facility] [phone] [facility] [phone] FX

## 2021-06-25 RX ORDER — GABAPENTIN 300 MG/1
300 CAPSULE ORAL 3 TIMES DAILY
Qty: 90 CAPSULE | Refills: 3 | Status: SHIPPED | OUTPATIENT
Start: 2021-06-25 | End: 2021-09-13 | Stop reason: SDUPTHER

## 2021-07-20 ENCOUNTER — OFFICE VISIT (OUTPATIENT)
Dept: ORTHOPEDIC SURGERY | Facility: CLINIC | Age: 71
End: 2021-07-20

## 2021-07-20 VITALS
SYSTOLIC BLOOD PRESSURE: 123 MMHG | DIASTOLIC BLOOD PRESSURE: 74 MMHG | HEIGHT: 69 IN | WEIGHT: 175.8 LBS | BODY MASS INDEX: 26.04 KG/M2 | HEART RATE: 60 BPM

## 2021-07-20 DIAGNOSIS — M25.551 RIGHT HIP PAIN: ICD-10-CM

## 2021-07-20 DIAGNOSIS — G80.1 SPASTIC DIPLEGIA (HCC): ICD-10-CM

## 2021-07-20 DIAGNOSIS — M62.89 HAMSTRING TIGHTNESS OF RIGHT LOWER EXTREMITY: Primary | ICD-10-CM

## 2021-07-20 PROCEDURE — 99213 OFFICE O/P EST LOW 20 MIN: CPT | Performed by: ORTHOPAEDIC SURGERY

## 2021-07-20 RX ORDER — MELOXICAM 15 MG/1
15 TABLET ORAL DAILY
COMMUNITY
Start: 2021-07-09 | End: 2022-06-27

## 2021-07-20 RX ORDER — FLUTICASONE PROPIONATE 50 MCG
SPRAY, SUSPENSION (ML) NASAL
COMMUNITY
Start: 2021-07-09

## 2021-07-20 RX ORDER — METHOCARBAMOL 750 MG/1
750 TABLET, FILM COATED ORAL 3 TIMES DAILY PRN
COMMUNITY
Start: 2021-07-02 | End: 2022-06-27

## 2021-07-20 NOTE — PROGRESS NOTES
Orthopaedic Clinic Note: Hip New Patient    Chief Complaint   Patient presents with   • Right Hip - Pain        HPI    Ion Borges is a 70 y.o. male who presents with right hip pain for 3 week(s). Onset atraumatic and gradual in nature. Pain is localized to gluteal region and posterior leg and is radiating down leg and is a 4/10 on the pain scale.Pain is described as aching and stabbing. Associated symptoms include pain and stiffness.  The pain is worse with walking, standing, climbing stairs, rising from seated position and any movement of the joint; resting, sitting and lying down improve the pain. Previous treatments have included: cane and anti-inflammatories since symptom onset. Although some transient relief was reported with these interventions, these conservative measures have failed and symptoms have persisted. The patient is limited in daily activities and has had a significant decrease in quality of life as a result. He denies fevers, chills, or constitutional symptoms.    I have reviewed the following portions of the patient's history:History of Present Illness    History reviewed. No pertinent past medical history.   Past Surgical History:   Procedure Laterality Date   • BACK SURGERY     • FOOT SURGERY      toe removed on left foot    • HERNIA REPAIR      x2   • KNEE SURGERY Right     Meniscal sx   • SHOULDER SURGERY Left     Rotator Cuff      Family History   Problem Relation Age of Onset   • Stroke Father      Social History     Socioeconomic History   • Marital status:      Spouse name: Not on file   • Number of children: Not on file   • Years of education: Not on file   • Highest education level: Not on file   Tobacco Use   • Smoking status: Former Smoker     Packs/day: 2.00     Years: 11.00     Pack years: 22.00     Types: Cigarettes, Pipe     Start date:      Quit date:      Years since quittin.5   • Smokeless tobacco: Never Used   Substance and Sexual Activity   •  Alcohol use: Yes     Comment: 0-3 daily   • Drug use: No   • Sexual activity: Defer      Current Outpatient Medications on File Prior to Visit   Medication Sig Dispense Refill   • ALBUTEROL SULFATE IN Inhale As Needed.     • Ascorbic Acid (VITAMIN C PO) Take  by mouth Daily.     • aspirin 81 MG EC tablet Take 81 mg by mouth Daily.     • CBD (cannabidiol) oral oil Take  by mouth Daily.     • Cholecalciferol (VITAMIN D3 PO) Take  by mouth Daily.     • coenzyme Q10 100 MG capsule Take 100 mg by mouth Daily.     • FEXOFENADINE HCL PO      • fluticasone (FLONASE) 50 MCG/ACT nasal spray SHAKE AND INSTILL 2 SPRAYS INTO TIMES IN EACH NOSTRIL TWICE DAILY     • gabapentin (Neurontin) 300 MG capsule Take 1 capsule by mouth 3 (Three) Times a Day. 90 capsule 3   • HYALURONAN  mg 2 (Two) Times a Day.     • lisinopril (PRINIVIL,ZESTRIL) 10 MG tablet Take 10 mg by mouth Daily.  0   • meloxicam (MOBIC) 15 MG tablet Take 15 mg by mouth Daily.     • methocarbamol (ROBAXIN) 750 MG tablet Take 750 mg by mouth 3 (Three) Times a Day As Needed.     • Multiple Vitamin (MULTI-VITAMIN DAILY PO) Take  by mouth.     • Omega-3 Fatty Acids (FISH OIL PO) Take  by mouth Daily.     • rosuvastatin (CRESTOR) 5 MG tablet rosuvastatin 5 mg tablet   Take 1 tablet every day by oral route.     • [DISCONTINUED] loratadine (CLARITIN) 10 MG tablet Take 10 mg by mouth Daily.       No current facility-administered medications on file prior to visit.      Allergies   Allergen Reactions   • Penicillins Dizziness        Review of Systems   Constitutional: Negative.    HENT: Negative.    Eyes: Negative.    Respiratory: Negative.    Cardiovascular: Negative.    Gastrointestinal: Negative.    Endocrine: Negative.    Genitourinary: Negative.    Musculoskeletal: Positive for arthralgias.   Skin: Negative.    Allergic/Immunologic: Negative.    Neurological: Negative.    Hematological: Negative.    Psychiatric/Behavioral: Negative.         The patient's Review of  "Systems was personally reviewed and confirmed as accurate.    The following portions of the patient's history were reviewed and updated as appropriate: allergies, current medications, past family history, past medical history, past social history, past surgical history and problem list.    Physical Exam  Blood pressure 123/74, pulse 60, height 175.3 cm (69.02\"), weight 79.7 kg (175 lb 12.8 oz).    Body mass index is 25.95 kg/m².    GENERAL APPEARANCE: awake, alert & oriented x 3, in no acute distress and well developed, well nourished  PSYCH: normal affect  LUNGS:  breathing nonlabored  EYES: sclera anicteric  CARDIOVASCULAR: palpable dorsalis pedis, palpable posterior tibial bilaterally. Capillary refill less than 2 seconds  EXTREMITIES: no clubbing, cyanosis  GAIT:  Normal and Shuffling           Right Hip Exam:  RANGE OF MOTION:   FLEXION CONTRACTURE: None   FLEXION: 110 degrees   INTERNAL ROTATION: 20 degrees at 90 degrees of flexion   EXTERNAL ROTATION: 40 degrees at 90 degrees of flexion    PAIN WITH HIP MOTION: no  PAIN WITH LOGROLL: no  STINCHFIELD TEST: negative    KNEE EXAM: full knee ROM (0-120 degrees), stable to varus and valgus stress at terminal extension and 30 degrees flexion     STRENGTH:  5/5 hip adduction, abduction, flexion. 5/5 strength knee flexion, extension. 5/5 strength ankle dorsiflexion and plantarflexion.     GREATER TROCHANTER BURSAL PAIN:  No  Tender to palpation posteriorly and hamstring musculature     REFLEXES:   PATELLAR 2+/4   ACHILLES 2+/4    CLONUS: negative  STRAIGHT LEG TEST:   negative    SENSATION TO LIGHT TOUCH:  DEEP PERONEAL/SUPERFICIAL PERONEAL/SURAL/SAPHENOUS/TIBIAL:  intact    EDEMA:   no  ERYTHEMA:  no  WOUNDS/INCISIONS: no overlying skin problems.      Left Hip Exam:   RANGE OF MOTION:   FLEXION CONTRACTURE: None   FLEXION: 110 degrees   INTERNAL ROTATION: 20 degrees at 90 degrees of flexion   EXTERNAL ROTATION: 40 degrees at 90 degrees of flexion    PAIN WITH HIP " MOTION: no  PAIN WITH LOGROLL: no  STINCHFIELD TEST: negative    KNEE EXAM: full knee ROM (0-120 degrees), stable to varus and valgus stress at terminal extension and 30 degrees flexion     STRENGTH:  5/5 hip adduction, abduction, flexion. 5/5 strength knee flexion, extension. 5/5 strength ankle dorsiflexion and plantarflexion.     GREATER TROCHANTER BURSAL PAIN:  no     REFLEXES:   PATELLAR 2+/4   ACHILLES 2+/4    CLONUS: negative  STRAIGHT LEG TEST:   negative    SENSATION TO LIGHT TOUCH:  DEEP PERONEAL/SUPERFICIAL PERONEAL/SURAL/SAPHENOUS/TIBIAL:  intact    EDEMA:   no  ERYTHEMA:  no  WOUNDS/INCISIONS: no overlying skin problems.      ------------------------------------------------------------------    LEG LENGTHS:  equal  _____________________________________________________  _____________________________________________________    RADIOGRAPHIC FINDINGS:   Indication: Right hip pain    Comparison: No prior xrays are available for comparison    AP pelvis, hip 2 views: Right: moderate joint space narrowing,  minimal osteophyte formation; Left: moderate joint space narrowing,  minimal osteophyte formation    Assessment/Plan:   Diagnosis Plan   1. Hamstring tightness of right lower extremity  Ambulatory Referral to Physical Therapy Evaluate and treat   2. Right hip pain  XR Hip With or Without Pelvis 2 - 3 View Right   3. Spastic diplegia (CMS/HCC)       Patient has osteoarthritis of the right hip.  However his hip range of motion is symmetric and asymptomatic on exam today.  The majority of his pain is localized to the hamstrings.  I discussed that he has hamstring tightness from his spastic diplegia.  This is likely contributing to his pain.  I recommend referral to physical therapy for stretching exercises.  He will follow-up as needed.    Cornel Pretty MD  07/20/21  09:43 EDT

## 2021-08-16 ENCOUNTER — TRANSCRIBE ORDERS (OUTPATIENT)
Dept: ADMINISTRATIVE | Facility: HOSPITAL | Age: 71
End: 2021-08-16

## 2021-08-16 DIAGNOSIS — E21.3 HYPERPARATHYROIDISM, UNSPECIFIED (HCC): Primary | ICD-10-CM

## 2021-09-13 ENCOUNTER — OFFICE VISIT (OUTPATIENT)
Dept: NEUROLOGY | Facility: CLINIC | Age: 71
End: 2021-09-13

## 2021-09-13 VITALS
OXYGEN SATURATION: 97 % | HEIGHT: 69 IN | DIASTOLIC BLOOD PRESSURE: 70 MMHG | WEIGHT: 172 LBS | HEART RATE: 61 BPM | SYSTOLIC BLOOD PRESSURE: 110 MMHG | BODY MASS INDEX: 25.48 KG/M2

## 2021-09-13 DIAGNOSIS — G62.9 NEUROPATHY: Primary | ICD-10-CM

## 2021-09-13 DIAGNOSIS — G56.23 ULNAR NEUROPATHY OF BOTH UPPER EXTREMITIES: ICD-10-CM

## 2021-09-13 PROCEDURE — 99213 OFFICE O/P EST LOW 20 MIN: CPT | Performed by: PSYCHIATRY & NEUROLOGY

## 2021-09-13 RX ORDER — GABAPENTIN 300 MG/1
300 CAPSULE ORAL 3 TIMES DAILY
Qty: 90 CAPSULE | Refills: 5 | Status: SHIPPED | OUTPATIENT
Start: 2021-09-13 | End: 2021-11-29 | Stop reason: SDUPTHER

## 2021-09-13 NOTE — PROGRESS NOTES
Subjective:    CC: Ion Borges is seen today for Neuropathy       HPI:  Current visit-patient states his neuropathy symptoms are well controlled in his feet.  The symptoms are mainly aggravated when it is cold outside.  He continues to take gabapentin 300 mg in the morning and 600 mg at night.  He recently pulled a muscle in his right leg and has been on meloxicam in addition to methocarbamol for it.  Denies any low back pain.  Has been compliant with elbow splints in both arms at night.  He also had neck and upper back pain a few months ago.  That has subsided now but he continues to have some scalp tingling however denies having any headaches.      Last visit-patient states that he changed his podiatrist and got new insoles and also got a wart removed which has helped with the pain in his feet.  He also denies having any burning in his feet and only gets the symptoms when it is cold outside.  He continues to take gabapentin 300 mg in the morning and 600 mg at night.  With regards to his low back pain he got physical therapy which has helped.  Has been trying to do the exercises at home now.  For his ulnar neuropathy he has elbow braces but is not very compliant with them.  He does have occasional numbness in his hands.    Last visit-  patient states that he has again started to have difficulty walking as he is big toe on the left is pushing against his second toe.  He does not want to have any more surgeries on his feet due to the long recovery process.  The balls of his feet continue to hurt however the burning in his feet is well controlled with gabapentin 300 mg in the morning and 600 mg at night.  Currently his back pain is also under good control as he continues to do back exercises at home.  He is still taking vitamin B6 and B12 supplements.  With regards to his ulnar neuropathy he is still occasionally wearing elbow splints.    Last visit-patient states his symptoms of burning in the feet are well  controlled with gabapentin 300 mg which he takes 1 in the morning and 2 at bedtime.  However according to his [facility] report patient has been taking it infrequently as he last picked up his gabapentin in July.  His back pain is also under good control.  He has finished physical therapy and now does the exercises at home.  With regards to his mild to moderate bilateral ulnar neuropathy patient wears elbow splints at night although not every day.    Last visit- since his last visit patient has had surgery for his hammertoes but not for the bunions.  The pain in his feet has improved but he still gets burning pain in the balls of his feet with prolonged standing at work.  His low back pain has also improved as he has been getting physical therapy.  With regards to his ulnar neuropathy he typically wears the elbow splints only on one side.     Last visit-patient feels that the pain in his feet has improved after starting gabapentin 300 mg twice a day.  He does not want to go up on the dose any further yet.  He still has not had the surgery for his bunions and hammertoes and will be having it next month.  He continues to have mild low back pain and stiffness but no pain radiating down his legs.  With regards to his ulnar neuropathy he still has not started wearing an elbow brace.     Last visit- Since his last visit patient continues to do the same and is currently denying any back pain radiating down to the legs.  He does have pain at the bottom of his feet more so with prolonged standing that is not well controlled with CBD oil.  He had all of his workup including the neuropathy panel showed that his B6 and B12 was on the lower limit of normal, other labs were normal.  I had asked him to take supplements which she is taking.  He also had his MRI L spine that I personally reviewed the results of which is as follows -Grade 1 anterolisthesis of L5 on S1. Multilevel spondylitic changes greatest at the L4-L5 and L5-S1  levels. At the L4-L5 level, there is right far lateral predominance of disc bulge along with significant facet hypertrophy producing moderate to severe spinal canal stenosis greatest in the right lateral region with moderate to severe right neuroforaminal stenosis and subarticular recess narrowing. At the L5-S1 level, there is moderate right and moderate to severe left neuroforaminal stenosis and left subarticular recess narrowing.  His EMG/NCS showed mild axonal peripheral neuropathy as well as multiple entrapment neuropathies including moderate left peroneal, mild to moderate bilateral ulnar and mild right median neuropathy.  As such there was no evidence of L5-S1 radiculopathy as the proximally innervated muscles in the legs were normal.  I have discussed the results of the MRI in detail with the patient however I feel he would not be a surgical candidate at this time as his back pain is mild with no radiation to the legs.  I did offer him a physical therapy referral but he has  been going for acupuncture for cervical radiculopathy and would like to try that first before trying PT.     Initial [id number]-[id number]-old male with a history of hypertension, hyperlipidemia, recently diagnosed prostate cancer, cervical laminectomy was referred here by orthopedics for hyperreflexia on examination.  As per patient he had symptoms of tingling in both his arms along with dragging of his left leg and inability to have a penile erection back in 1980s.  After that he had an MRI of his cervical spine that had showed diffused vertebrae in his neck.  He postponed having surgery for several years but finally had a laminectomy at C3-4, C4-5 in 1990s.  After the surgery the tingling in his arms improved however he continued to drag his left leg and have sexual dysfunction.  He also started to have low back pain off and on about 10 years ago but he denies any radiation of the pain down his legs.  Denies any tingling or numbness in his  feet but does feel extremely cold in his hands and feet and also has pain in the bottom of his feet on both sides that worsens with prolonged standing.  He was diagnosed with plantar fasciitis and was fitted with special insoles which relieved his pain initially but that has restarted again now.  He also has hammertoes and bunions in his left foot and recently saw an orthopedic surgeon who felt he was a good surgical candidate however since he had hyperreflexia in his lower extremities on examination  she referred him here.  He denies having a history of diabetes but does drink alcohol every day for the past several years (either 2 glasses of wine or 2-3 beers every day).    The following portions of the patient's history were reviewed today and updated as of 09/24/2019  : allergies, current medications, past family history, past medical history, past social history, past surgical history and problem list  These document will be scanned to patient's chart.      Current Outpatient Medications:   •  ALBUTEROL SULFATE IN, Inhale As Needed., Disp: , Rfl:   •  Ascorbic Acid (VITAMIN C PO), Take  by mouth Daily., Disp: , Rfl:   •  aspirin 81 MG EC tablet, Take 81 mg by mouth Daily., Disp: , Rfl:   •  CBD (cannabidiol) oral oil, Take  by mouth Daily., Disp: , Rfl:   •  Cholecalciferol (VITAMIN D3 PO), Take  by mouth Daily., Disp: , Rfl:   •  coenzyme Q10 100 MG capsule, Take 100 mg by mouth Daily., Disp: , Rfl:   •  FEXOFENADINE HCL PO, , Disp: , Rfl:   •  fluticasone (FLONASE) 50 MCG/ACT nasal spray, SHAKE AND INSTILL 2 SPRAYS INTO TIMES IN EACH NOSTRIL TWICE DAILY, Disp: , Rfl:   •  gabapentin (Neurontin) 300 MG capsule, Take 1 capsule by mouth 3 (Three) Times a Day., Disp: 90 capsule, Rfl: 5  •  HYALURONAN IX, 200 mg 2 (Two) Times a Day., Disp: , Rfl:   •  lisinopril (PRINIVIL,ZESTRIL) 10 MG tablet, Take 10 mg by mouth Daily., Disp: , Rfl: 0  •  meloxicam (MOBIC) 15 MG tablet, Take 15 mg by mouth Daily., Disp: , Rfl:  "  •  methocarbamol (ROBAXIN) 750 MG tablet, Take 750 mg by mouth 3 (Three) Times a Day As Needed., Disp: , Rfl:   •  Multiple Vitamin (MULTI-VITAMIN DAILY PO), Take  by mouth., Disp: , Rfl:   •  Omega-3 Fatty Acids (FISH OIL PO), Take  by mouth Daily., Disp: , Rfl:   •  rosuvastatin (CRESTOR) 5 MG tablet, rosuvastatin 5 mg tablet  Take 1 tablet every day by oral route., Disp: , Rfl:    History reviewed. No pertinent past medical history.   Past Surgical History:   Procedure Laterality Date   • BACK SURGERY     • FOOT SURGERY      toe removed on left foot    • HERNIA REPAIR      x2   • KNEE SURGERY Right     Meniscal sx   • SHOULDER SURGERY Left     Rotator Cuff      Family History   Problem Relation Age of Onset   • Stroke Father       Social History     Socioeconomic History   • Marital status:      Spouse name: Not on file   • Number of children: Not on file   • Years of education: Not on file   • Highest education level: Not on file   Tobacco Use   • Smoking status: Former Smoker     Packs/day: 2.00     Years: 11.00     Pack years: 22.00     Types: Cigarettes, Pipe     Start date:      Quit date:      Years since quittin.7   • Smokeless tobacco: Never Used   Vaping Use   • Vaping Use: Never used   Substance and Sexual Activity   • Alcohol use: Yes     Comment: 0-3 daily   • Drug use: No   • Sexual activity: Defer     Review of Systems   Musculoskeletal: Positive for gait problem.   All other systems reviewed and are negative.      Objective:    /70   Pulse 61   Ht 175.3 cm (69.02\")   Wt 78 kg (172 lb)   SpO2 97%   BMI 25.39 kg/m²     Neurology Exam:    General apperance: NAD.     Mental status: Alert, awake and oriented to time place and person.    Recent and Remote memory: Intact.    Attention span and Concentration: Normal.     Language and Speech: Intact- No dysarthria.    Fluency, Naming , Repitition and Comprehension:  Intact    Cranial Nerves:   CN II: Visual fields are full. " Intact. Fundi - Normal, No papillederma, Pupils - CHAIM  CN III, IV and VI: Extraocular movements are intact. Normal saccades.   CN V: Facial sensation is intact.   CN VII: Muscles of facial expression reveal no asymmetry. Intact.   CN VIII: Hearing is intact. Whispered voice intact.   CN IX and X: Palate elevates symmetrically. Intact  CN XI: Shoulder shrug is intact.   CN XII: Tongue is midline without evidence of atrophy or fasciculation.     Ophthalmoscopic exam of optic disc-normal    Motor:  Right UE muscle strength 5/5. Normal tone.     Left UE muscle strength 5/5. Normal tone.      Right LE muscle strength5/5. Normal tone.     Left LE muscle strength 5/5.  Plantarflexion and dorsiflexion-5/5, Eversion and inversion-4/5, Normal tone.      Sensory: Normal light touch and pinprick sensation bilaterally.  Impaired vibration in both upper and lower extremities    DTRs: 2+ bilaterally in upper and 3+ in b/l lower extremities.  No clonus     Babinski: Negative bilaterally.    Co-ordination: Normal finger-to-nose, heel to shin B/L.    Rhomberg: Negative.    Gait:  He did drag his left leg a little .  His left foot was also plantarflexed while walking    Cardiovascular: Regular rate and rhythm without murmur, gallop or rub.    Assessment and Plan:  1. Neuropathy  Continue gabapentin 300 mg in the morning and 600 mg at night.   He has some scalp tingling that could be due to irritation of the occipital nerve.  Gabapentin should help however if he develops headaches then I could give him an occipital nerve block    2.  Ulnar neuropathy of both upper extremities  I have once again counseled him to be compliant with his elbow splints.  EMG/NCS showed mild to moderate ulnar neuropathy bilaterally      Return in about 9 months (around 6/13/2022).         Brook Huang MD

## 2021-11-09 ENCOUNTER — APPOINTMENT (OUTPATIENT)
Dept: BONE DENSITY | Facility: HOSPITAL | Age: 71
End: 2021-11-09

## 2021-11-29 ENCOUNTER — HOSPITAL ENCOUNTER (OUTPATIENT)
Dept: BONE DENSITY | Facility: HOSPITAL | Age: 71
Discharge: HOME | End: 2021-11-29
Admitting: INTERNAL MEDICINE

## 2021-11-29 DIAGNOSIS — G62.9 NEUROPATHY: ICD-10-CM

## 2021-11-29 DIAGNOSIS — E21.3 HYPERPARATHYROIDISM, UNSPECIFIED (HCC): ICD-10-CM

## 2021-11-29 PROCEDURE — 77080 DXA BONE DENSITY AXIAL: CPT

## 2021-11-29 RX ORDER — GABAPENTIN 300 MG/1
300 CAPSULE ORAL 3 TIMES DAILY
Qty: 90 CAPSULE | Refills: 5 | Status: SHIPPED | OUTPATIENT
Start: 2021-11-29 | End: 2022-06-16

## 2021-11-29 NOTE — TELEPHONE ENCOUNTER
Caller: Ion Borges    Relationship: Self    Best call back number: [phone]    Requested Prescriptions:   Requested Prescriptions     Pending Prescriptions Disp Refills   • gabapentin (Neurontin) 300 MG capsule 90 capsule 5     Sig: Take 1 capsule by mouth 3 (Three) Times a Day.        Pharmacy where request should be sent: [facility].    Additional details provided by patient: PT WILL BE OUT ON 12-1-21    Does the patient have less than a 3 day supply:  [x] Yes  [] No    Ashley Ledesma Rep   11/29/21 14:14 EST

## 2022-06-15 DIAGNOSIS — G62.9 NEUROPATHY: ICD-10-CM

## 2022-06-16 RX ORDER — GABAPENTIN 300 MG/1
CAPSULE ORAL
Qty: 90 CAPSULE | Refills: 3 | Status: SHIPPED | OUTPATIENT
Start: 2022-06-16 | End: 2022-12-16 | Stop reason: SDUPTHER

## 2022-06-16 NOTE — TELEPHONE ENCOUNTER
Rx Refill Note  Requested Prescriptions     Pending Prescriptions Disp Refills   • gabapentin (NEURONTIN) 300 MG capsule [Pharmacy Med Name: GABAPENTIN 300MG CAPSULES] 90 capsule      Sig: TAKE 1 CAPSULE BY MOUTH THREE TIMES DAILY      Last office visit with prescribing clinician: 9/13/2021      Next office visit with prescribing clinician: 6/27/2022            Lorena Caballero MA  06/16/22, 09:58 EDT

## 2022-06-16 NOTE — TELEPHONE ENCOUNTER
Caller: JAREK   Relationship to Patient: SELF  Phone Number: [phone]    Reason for Call: PATIENT CALLED TO STATE HE NEEDS THIS REFILL - HE STATES WENT TO [place] IN MAY [facility] HAD TRANSFERRED A REFILL TO [place] BECAUSE HE RAN OUT DURING THAT TIME, HE STATES THEY SENT ALL THE REFILLS TO [place] AND NOW THEY HAVE NONE ON FILE AND THEY CAN NOT TRANSFER BACK. PLEASE REVIEW AND ADVISE.    I ADVISE PT THAT WE ARE WAITING ON PROVIDER APPROVAL. PT CURRENTLY HAS ENOUGH MEDICATION UNTIL 6-30-22 BUT STATE HE JUST NEEDS THE REFILLS TO GO THE PHARMACY.

## 2022-06-27 ENCOUNTER — OFFICE VISIT (OUTPATIENT)
Dept: NEUROLOGY | Facility: CLINIC | Age: 72
End: 2022-06-27

## 2022-06-27 VITALS
RESPIRATION RATE: 16 BRPM | DIASTOLIC BLOOD PRESSURE: 70 MMHG | BODY MASS INDEX: 25.59 KG/M2 | HEART RATE: 63 BPM | SYSTOLIC BLOOD PRESSURE: 130 MMHG | OXYGEN SATURATION: 97 % | WEIGHT: 173.4 LBS

## 2022-06-27 DIAGNOSIS — G62.9 NEUROPATHY: Primary | ICD-10-CM

## 2022-06-27 DIAGNOSIS — G56.23 ULNAR NEUROPATHY OF BOTH UPPER EXTREMITIES: ICD-10-CM

## 2022-06-27 PROCEDURE — 99213 OFFICE O/P EST LOW 20 MIN: CPT | Performed by: PSYCHIATRY & NEUROLOGY

## 2022-06-27 NOTE — PROGRESS NOTES
Subjective:    CC: Ion Borges is seen today for neuropathy    HPI:  Current visit- patient states he tried to cut back on his gabapentin to 300 mg twice a day but the pain in the bottoms of his feet increased therefore he has increased his dose again to 300 mg 3 times a day.  At times he forgets to take his afternoon dose.  For his left foot drop he cannot wear an AFO as he has a plantar wart which is extremely painful.  His podiatrist has been treating him for it.  For his ulnar neuropathy he had not been wearing his elbow splints but has just started back again.    Last visit-patient states his neuropathy symptoms are well controlled in his feet.  The symptoms are mainly aggravated when it is cold outside.  He continues to take gabapentin 300 mg in the morning and 600 mg at night.  He recently pulled a muscle in his right leg and has been on meloxicam in addition to methocarbamol for it.  Denies any low back pain.  Has been compliant with elbow splints in both arms at night.  He also had neck and upper back pain a few months ago.  That has subsided now but he continues to have some scalp tingling however denies having any headaches.      Last visit-patient states that he changed his podiatrist and got new insoles and also got a wart removed which has helped with the pain in his feet.  He also denies having any burning in his feet and only gets the symptoms when it is cold outside.  He continues to take gabapentin 300 mg in the morning and 600 mg at night.  With regards to his low back pain he got physical therapy which has helped.  Has been trying to do the exercises at home now.  For his ulnar neuropathy he has elbow braces but is not very compliant with them.  He does have occasional numbness in his hands.    Last visit-  patient states that he has again started to have difficulty walking as he is big toe on the left is pushing against his second toe.  He does not want to have any more surgeries on his  feet due to the long recovery process.  The balls of his feet continue to hurt however the burning in his feet is well controlled with gabapentin 300 mg in the morning and 600 mg at night.  Currently his back pain is also under good control as he continues to do back exercises at home.  He is still taking vitamin B6 and B12 supplements.  With regards to his ulnar neuropathy he is still occasionally wearing elbow splints.    Last visit-patient states his symptoms of burning in the feet are well controlled with gabapentin 300 mg which he takes 1 in the morning and 2 at bedtime.  However according to his [facility] report patient has been taking it infrequently as he last picked up his gabapentin in July.  His back pain is also under good control.  He has finished physical therapy and now does the exercises at home.  With regards to his mild to moderate bilateral ulnar neuropathy patient wears elbow splints at night although not every day.    Last visit- since his last visit patient has had surgery for his hammertoes but not for the bunions.  The pain in his feet has improved but he still gets burning pain in the balls of his feet with prolonged standing at work.  His low back pain has also improved as he has been getting physical therapy.  With regards to his ulnar neuropathy he typically wears the elbow splints only on one side.     Last visit-patient feels that the pain in his feet has improved after starting gabapentin 300 mg twice a day.  He does not want to go up on the dose any further yet.  He still has not had the surgery for his bunions and hammertoes and will be having it next month.  He continues to have mild low back pain and stiffness but no pain radiating down his legs.  With regards to his ulnar neuropathy he still has not started wearing an elbow brace.     Last visit- Since his last visit patient continues to do the same and is currently denying any back pain radiating down to the legs.  He does have pain  at the bottom of his feet more so with prolonged standing that is not well controlled with CBD oil.  He had all of his workup including the neuropathy panel showed that his B6 and B12 was on the lower limit of normal, other labs were normal.  I had asked him to take supplements which she is taking.  He also had his MRI L spine that I personally reviewed the results of which is as follows -Grade 1 anterolisthesis of L5 on S1. Multilevel spondylitic changes greatest at the L4-L5 and L5-S1 levels. At the L4-L5 level, there is right far lateral predominance of disc bulge along with significant facet hypertrophy producing moderate to severe spinal canal stenosis greatest in the right lateral region with moderate to severe right neuroforaminal stenosis and subarticular recess narrowing. At the L5-S1 level, there is moderate right and moderate to severe left neuroforaminal stenosis and left subarticular recess narrowing.  His EMG/NCS showed mild axonal peripheral neuropathy as well as multiple entrapment neuropathies including moderate left peroneal, mild to moderate bilateral ulnar and mild right median neuropathy.  As such there was no evidence of L5-S1 radiculopathy as the proximally innervated muscles in the legs were normal.  I have discussed the results of the MRI in detail with the patient however I feel he would not be a surgical candidate at this time as his back pain is mild with no radiation to the legs.  I did offer him a physical therapy referral but he has  been going for acupuncture for cervical radiculopathy and would like to try that first before trying PT.     Initial [id number]-89-ibuk-old male with a history of hypertension, hyperlipidemia, recently diagnosed prostate cancer, cervical laminectomy was referred here by orthopedics for hyperreflexia on examination.  As per patient he had symptoms of tingling in both his arms along with dragging of his left leg and inability to have a penile erection back in  1980s.  After that he had an MRI of his cervical spine that had showed diffused vertebrae in his neck.  He postponed having surgery for several years but finally had a laminectomy at C3-4, C4-5 in 1990s.  After the surgery the tingling in his arms improved however he continued to drag his left leg and have sexual dysfunction.  He also started to have low back pain off and on about 10 years ago but he denies any radiation of the pain down his legs.  Denies any tingling or numbness in his feet but does feel extremely cold in his hands and feet and also has pain in the bottom of his feet on both sides that worsens with prolonged standing.  He was diagnosed with plantar fasciitis and was fitted with special insoles which relieved his pain initially but that has restarted again now.  He also has hammertoes and bunions in his left foot and recently saw an orthopedic surgeon who felt he was a good surgical candidate however since he had hyperreflexia in his lower extremities on examination  she referred him here.  He denies having a history of diabetes but does drink alcohol every day for the past several years (either 2 glasses of wine or 2-3 beers every day).    The following portions of the patient's history were reviewed today and updated as of 09/24/2019  : allergies, current medications, past family history, past medical history, past social history, past surgical history and problem list  These document will be scanned to patient's chart.      Current Outpatient Medications:   •  ALBUTEROL SULFATE IN, Inhale As Needed., Disp: , Rfl:   •  Ascorbic Acid (VITAMIN C PO), Take  by mouth Daily., Disp: , Rfl:   •  aspirin 81 MG EC tablet, Take 81 mg by mouth Daily., Disp: , Rfl:   •  CBD (cannabidiol) oral oil, Take  by mouth Daily., Disp: , Rfl:   •  Cholecalciferol (VITAMIN D3 PO), Take  by mouth Daily., Disp: , Rfl:   •  coenzyme Q10 100 MG capsule, Take 100 mg by mouth Daily., Disp: , Rfl:   •  FEXOFENADINE HCL PO, ,  Disp: , Rfl:   •  fluticasone (FLONASE) 50 MCG/ACT nasal spray, SHAKE AND INSTILL 2 SPRAYS INTO TIMES IN EACH NOSTRIL TWICE DAILY, Disp: , Rfl:   •  gabapentin (NEURONTIN) 300 MG capsule, TAKE 1 CAPSULE BY MOUTH THREE TIMES DAILY, Disp: 90 capsule, Rfl: 3  •  HYALURONAN IX, 200 mg 2 (Two) Times a Day., Disp: , Rfl:   •  lisinopril (PRINIVIL,ZESTRIL) 10 MG tablet, Take 10 mg by mouth Daily., Disp: , Rfl: 0  •  Multiple Vitamin (MULTI-VITAMIN DAILY PO), Take  by mouth., Disp: , Rfl:   •  Omega-3 Fatty Acids (FISH OIL PO), Take  by mouth Daily., Disp: , Rfl:   •  rosuvastatin (CRESTOR) 5 MG tablet, rosuvastatin 5 mg tablet  Take 1 tablet every day by oral route., Disp: , Rfl:    No past medical history on file.   Past Surgical History:   Procedure Laterality Date   • BACK SURGERY     • FOOT SURGERY      toe removed on left foot    • HERNIA REPAIR      x2   • KNEE SURGERY Right     Meniscal sx   • SHOULDER SURGERY Left     Rotator Cuff      Family History   Problem Relation Age of Onset   • Stroke Father       Social History     Socioeconomic History   • Marital status:    Tobacco Use   • Smoking status: Former Smoker     Packs/day: 2.00     Years: 11.00     Pack years: 22.00     Types: Cigarettes, Pipe     Start date:      Quit date:      Years since quittin.5   • Smokeless tobacco: Never Used   Vaping Use   • Vaping Use: Never used   Substance and Sexual Activity   • Alcohol use: Yes     Comment: 0-3 daily   • Drug use: No   • Sexual activity: Defer     Review of Systems   Musculoskeletal: Positive for gait problem.   All other systems reviewed and are negative.      Objective:    /70   Pulse 63   Resp 16   Wt 78.7 kg (173 lb 6.4 oz)   SpO2 97%   BMI 25.59 kg/m²     Neurology Exam:    General apperance: NAD.     Mental status: Alert, awake and oriented to time place and person.    Recent and Remote memory: Intact.    Attention span and Concentration: Normal.     Language and Speech:  Intact- No dysarthria.    Fluency, Naming , Repitition and Comprehension:  Intact    Cranial Nerves:   CN II: Visual fields are full. Intact. Fundi - Normal, No papillederma, Pupils - CHAIM  CN III, IV and VI: Extraocular movements are intact. Normal saccades.   CN V: Facial sensation is intact.   CN VII: Muscles of facial expression reveal no asymmetry. Intact.   CN VIII: Hearing is intact. Whispered voice intact.   CN IX and X: Palate elevates symmetrically. Intact  CN XI: Shoulder shrug is intact.   CN XII: Tongue is midline without evidence of atrophy or fasciculation.     Ophthalmoscopic exam of optic disc-normal    Motor:  Right UE muscle strength 5/5. Normal tone.     Left UE muscle strength 5/5. Normal tone.      Right LE muscle strength5/5. Normal tone.     Left LE muscle strength 5/5.  Plantarflexion and dorsiflexion-4/5, Eversion and inversion-4/5, Normal tone.      Sensory: Normal light touch and pinprick sensation bilaterally.  Impaired vibration in both upper and lower extremities    DTRs: 2+ bilaterally in upper and 3+ in b/l lower extremities.  No clonus     Babinski: Negative bilaterally.    Co-ordination: Normal finger-to-nose, heel to shin B/L.    Rhomberg: Negative.    Gait: Scissoring gait.  His left foot was also plantarflexed while walking    Cardiovascular: Regular rate and rhythm without murmur, gallop or rub.    Assessment and Plan:  1. Neuropathy  Continue gabapentin 300 mg 3 times a day.  If he forgets his afternoon dose he can take a dose of 300 mg in the morning and 600 mg at night  Once his plantar wart resolves I have asked him to start wearing an AFO for his left foot drop which is secondary to left peroneal neuropathy that was seen on his EMG/NCS    2.  Ulnar neuropathy of both upper extremities  I have once again counseled him to be compliant with his elbow splints.  EMG/NCS showed mild to moderate ulnar neuropathy bilaterally      Return in about 1 year (around 6/27/2023).          Brook Huang MD

## 2022-12-16 DIAGNOSIS — G62.9 NEUROPATHY: ICD-10-CM

## 2022-12-16 NOTE — TELEPHONE ENCOUNTER
Caller: Ion Borges    Relationship: Self    Best call back number: [phone]    Requested Prescriptions:   Requested Prescriptions     Pending Prescriptions Disp Refills   • gabapentin (NEURONTIN) 300 MG capsule 90 capsule 3     Sig: Take 1 capsule by mouth 3 (Three) Times a Day.        Pharmacy where request should be sent:   [facility]   PHONE [phone]  PLEASE NOTE THAT THIS IS A PHARM CHANGE.    Additional details provided by patient:   PT NEEDS A 90 DAY SUPPLY.     Does the patient have less than a 3 day supply:  [] Yes  [x] No    Would you like a call back once the refill request has been completed: [x] Yes [] No    If the office needs to give you a call back, can they leave a voicemail: [x] Yes [] No    Ashley Ledesma Rep   12/16/22 16:27 EST

## 2022-12-19 RX ORDER — GABAPENTIN 300 MG/1
300 CAPSULE ORAL 3 TIMES DAILY
Qty: 270 CAPSULE | Refills: 1 | Status: SHIPPED | OUTPATIENT
Start: 2022-12-19

## 2022-12-19 NOTE — TELEPHONE ENCOUNTER
Requesting 90 day supply for cost reasons. Pending to provider.     Caught up on appts and follow up scheduled 6/26/23

## 2023-01-19 ENCOUNTER — HOSPITAL ENCOUNTER (OUTPATIENT)
Dept: GENERAL RADIOLOGY | Facility: HOSPITAL | Age: 73
Discharge: HOME | End: 2023-01-19
Admitting: INTERNAL MEDICINE
Payer: MEDICARE

## 2023-01-19 ENCOUNTER — TRANSCRIBE ORDERS (OUTPATIENT)
Dept: ADMINISTRATIVE | Facility: HOSPITAL | Age: 73
End: 2023-01-19
Payer: MEDICARE

## 2023-01-19 DIAGNOSIS — M54.50 LUMBAR PAIN: Primary | ICD-10-CM

## 2023-01-19 DIAGNOSIS — M54.50 LUMBAR PAIN: ICD-10-CM

## 2023-01-19 PROCEDURE — 72110 X-RAY EXAM L-2 SPINE 4/>VWS: CPT

## 2023-03-06 ENCOUNTER — HOSPITAL ENCOUNTER (OUTPATIENT)
Dept: CARDIOLOGY | Facility: HOSPITAL | Age: 73
Discharge: HOME | End: 2023-03-06
Admitting: PODIATRIST
Payer: MEDICARE

## 2023-03-06 ENCOUNTER — TRANSCRIBE ORDERS (OUTPATIENT)
Dept: CARDIOLOGY | Facility: HOSPITAL | Age: 73
End: 2023-03-06
Payer: MEDICARE

## 2023-03-06 DIAGNOSIS — R07.9 CHEST PAIN, UNSPECIFIED TYPE: Primary | ICD-10-CM

## 2023-03-06 LAB
QT INTERVAL: 416 MS
QTC INTERVAL: 416 MS

## 2023-03-06 PROCEDURE — 93005 ELECTROCARDIOGRAM TRACING: CPT | Performed by: PODIATRIST

## 2023-04-25 ENCOUNTER — TRANSCRIBE ORDERS (OUTPATIENT)
Dept: ADMINISTRATIVE | Facility: HOSPITAL | Age: 73
End: 2023-04-25
Payer: MEDICARE

## 2023-04-25 ENCOUNTER — HOSPITAL ENCOUNTER (OUTPATIENT)
Dept: GENERAL RADIOLOGY | Facility: HOSPITAL | Age: 73
Discharge: HOME | End: 2023-04-25
Admitting: INTERNAL MEDICINE
Payer: MEDICARE

## 2023-04-25 DIAGNOSIS — R52 PAIN: Primary | ICD-10-CM

## 2023-04-25 PROCEDURE — 73502 X-RAY EXAM HIP UNI 2-3 VIEWS: CPT

## 2023-09-14 ENCOUNTER — TELEPHONE (OUTPATIENT)
Dept: GASTROENTEROLOGY | Facility: CLINIC | Age: 73
End: 2023-09-14

## 2023-09-14 NOTE — TELEPHONE ENCOUNTER
"Caller: Ion Borges \"Dejan\"    Relationship to patient: Self    Best call back number: [phone]    Patient is needing: PT CALLED TO SCHEDULE A COLONOSCOPY, HE RECEIVED HIS 5 YEAR RECALL IN THE MAIL. SPOKE WITH LAKEISHA AT THE OFFICE AND HE TRIED TO TRANSFER ME TO [facility] BUT UNABLE TO REACH HER. PLEASE HAVE HER CALL PT BACK TO SCHEDULE.     "

## 2024-02-07 DIAGNOSIS — G62.9 NEUROPATHY: ICD-10-CM

## 2024-02-08 RX ORDER — GABAPENTIN 300 MG/1
300 CAPSULE ORAL 3 TIMES DAILY
Qty: 270 CAPSULE | Refills: 1 | Status: SHIPPED | OUTPATIENT
Start: 2024-02-08

## 2024-02-08 NOTE — TELEPHONE ENCOUNTER
Rx Refill Note  Requested Prescriptions     Pending Prescriptions Disp Refills    gabapentin (NEURONTIN) 300 MG capsule [Pharmacy Med Name: GABAPENTIN CAPS 300MG] 270 capsule 1     Sig: TAKE 1 CAPSULE THREE TIMES A DAY      Last filled:6/26/23 with 1 refill  Last office visit with prescribing clinician: 6/26/2023      Next office visit with prescribing clinician: 7/1/2024     DEJA CANELA  02/08/24, 12:01 EST    Pending to provider.

## 2024-03-28 ENCOUNTER — TELEPHONE (OUTPATIENT)
Dept: NEUROLOGY | Facility: CLINIC | Age: 74
End: 2024-03-28
Payer: MEDICARE

## 2024-03-28 NOTE — TELEPHONE ENCOUNTER
Caller: DARYL    Relationship: W/ DR. DIAN BEDOLLA'S OFFICE    Best call back number: [phone]    What form or medical record are you requesting: MRI LSPINE 11/12/2018    Who is requesting this form or medical record from you: DR. BEDOLLA    How would you like to receive the form or medical records (pick-up, mail, fax): FAX  If fax, what is the fax number: [phone]    Timeframe paperwork needed: ASAP    Additional notes: REQUESTING COPY OF MRI LSPINE RESULTS BE FAXED TO DR. BEDOLLA'S OFFICE    PLEASE REVIEW AND ADVISE.

## 2024-04-26 ENCOUNTER — TRANSCRIBE ORDERS (OUTPATIENT)
Dept: ADMINISTRATIVE | Facility: HOSPITAL | Age: 74
End: 2024-04-26
Payer: MEDICARE

## 2024-04-26 DIAGNOSIS — R07.9 CHEST PAIN, UNSPECIFIED TYPE: Primary | ICD-10-CM

## 2024-05-14 ENCOUNTER — HOSPITAL ENCOUNTER (OUTPATIENT)
Facility: HOSPITAL | Age: 74
Discharge: HOME | End: 2024-05-14
Payer: MEDICARE

## 2024-05-14 DIAGNOSIS — R07.9 CHEST PAIN, UNSPECIFIED TYPE: ICD-10-CM

## 2024-05-14 LAB
BH CV REST NUCLEAR ISOTOPE DOSE: 9.9 MCI
BH CV STRESS BP STAGE 2: NORMAL
BH CV STRESS BP STAGE 4: NORMAL
BH CV STRESS COMMENTS STAGE 1: NORMAL
BH CV STRESS DOSE REGADENOSON STAGE 1: 0.4
BH CV STRESS DURATION MIN STAGE 1: 1
BH CV STRESS DURATION MIN STAGE 2: 1
BH CV STRESS DURATION MIN STAGE 3: 1
BH CV STRESS DURATION MIN STAGE 4: 1
BH CV STRESS DURATION SEC STAGE 1: 10
BH CV STRESS DURATION SEC STAGE 2: 0
BH CV STRESS DURATION SEC STAGE 3: 0
BH CV STRESS DURATION SEC STAGE 4: 0
BH CV STRESS HR STAGE 1: 53
BH CV STRESS HR STAGE 2: 74
BH CV STRESS HR STAGE 3: 72
BH CV STRESS HR STAGE 4: 73
BH CV STRESS NUCLEAR ISOTOPE DOSE: 32.4 MCI
BH CV STRESS O2 STAGE 1: 98
BH CV STRESS O2 STAGE 2: 98
BH CV STRESS O2 STAGE 3: 99
BH CV STRESS O2 STAGE 4: 98
BH CV STRESS PROTOCOL 1: NORMAL
BH CV STRESS RECOVERY BP: NORMAL MMHG
BH CV STRESS RECOVERY HR: 66 BPM
BH CV STRESS RECOVERY O2: 98 %
BH CV STRESS STAGE 1: 1
BH CV STRESS STAGE 2: 2
BH CV STRESS STAGE 3: 3
BH CV STRESS STAGE 4: 4
LV EF NUC BP: 74 %
MAXIMAL PREDICTED HEART RATE: 147 BPM
PERCENT MAX PREDICTED HR: 52.38 %
STRESS BASELINE BP: NORMAL MMHG
STRESS BASELINE HR: 55 BPM
STRESS O2 SAT REST: 98 %
STRESS PERCENT HR: 62 %
STRESS POST ESTIMATED WORKLOAD: 1 METS
STRESS POST EXERCISE DUR MIN: 4 MIN
STRESS POST EXERCISE DUR SEC: 0 SEC
STRESS POST O2 SAT PEAK: 98 %
STRESS POST PEAK BP: NORMAL MMHG
STRESS POST PEAK HR: 77 BPM
STRESS TARGET HR: 125 BPM

## 2024-05-14 PROCEDURE — 78452 HT MUSCLE IMAGE SPECT MULT: CPT

## 2024-05-14 PROCEDURE — 0 TECHNETIUM SESTAMIBI: Performed by: INTERNAL MEDICINE

## 2024-05-14 PROCEDURE — 25010000002 REGADENOSON 0.4 MG/5ML SOLUTION: Performed by: INTERNAL MEDICINE

## 2024-05-14 PROCEDURE — A9500 TC99M SESTAMIBI: HCPCS | Performed by: INTERNAL MEDICINE

## 2024-05-14 PROCEDURE — 93017 CV STRESS TEST TRACING ONLY: CPT

## 2024-05-14 RX ORDER — REGADENOSON 0.08 MG/ML
0.4 INJECTION, SOLUTION INTRAVENOUS ONCE
Status: COMPLETED | OUTPATIENT
Start: 2024-05-14 | End: 2024-05-14

## 2024-05-14 RX ADMIN — REGADENOSON 0.4 MG: 0.08 INJECTION INTRAVENOUS at 10:19

## 2024-05-14 RX ADMIN — TECHNETIUM TC 99M SESTAMIBI 1 DOSE: 1 INJECTION INTRAVENOUS at 08:10

## 2024-05-14 RX ADMIN — TECHNETIUM TC 99M SESTAMIBI 1 DOSE: 1 INJECTION INTRAVENOUS at 10:10

## 2024-08-15 ENCOUNTER — OFFICE VISIT (OUTPATIENT)
Dept: NEUROLOGY | Facility: CLINIC | Age: 74
End: 2024-08-15
Payer: MEDICARE

## 2024-08-15 VITALS
WEIGHT: 163 LBS | BODY MASS INDEX: 24.14 KG/M2 | HEIGHT: 69 IN | SYSTOLIC BLOOD PRESSURE: 120 MMHG | HEART RATE: 78 BPM | DIASTOLIC BLOOD PRESSURE: 70 MMHG | OXYGEN SATURATION: 97 %

## 2024-08-15 DIAGNOSIS — G62.9 NEUROPATHY: Primary | ICD-10-CM

## 2024-08-15 DIAGNOSIS — R26.89 BALANCE DISORDER: ICD-10-CM

## 2024-08-15 PROCEDURE — 1159F MED LIST DOCD IN RCRD: CPT | Performed by: PSYCHIATRY & NEUROLOGY

## 2024-08-15 PROCEDURE — 99214 OFFICE O/P EST MOD 30 MIN: CPT | Performed by: PSYCHIATRY & NEUROLOGY

## 2024-08-15 PROCEDURE — 1160F RVW MEDS BY RX/DR IN RCRD: CPT | Performed by: PSYCHIATRY & NEUROLOGY

## 2024-08-15 RX ORDER — GABAPENTIN 300 MG/1
300 CAPSULE ORAL 3 TIMES DAILY
Qty: 270 CAPSULE | Refills: 1 | Status: SHIPPED | OUTPATIENT
Start: 2024-08-15

## 2024-08-15 NOTE — PROGRESS NOTES
Subjective:    CC: Ion Borges is seen today for neuropathy    HPI:  Current visit-patient states that his neuropathy symptoms are well-controlled with gabapentin 300 mg 3 times daily.  The numbness in his hands is also well-controlled if he keeps his arm straight at night.  Occasionally wears elbow braces.  He has had some balance issues for example when he is walking he starts walking faster.  Has been getting PT and was told that there was little improvement.  In the past he has also been told that he had a limb length discrepancy with his right leg being slightly shorter than the left and his hip also tilting to the right side.  Otherwise he denies any complaints of anosmia, acting out his dreams at night, tremors or constipation.  He also denies having any low back pain unless he exerts himself.    Last visit-patient states he had another left foot surgery.  Since then his walking has improved.  He has still not started wearing a left AFO.  The pain in his feet is adequately controlled with gabapentin 300 mg 2-3 times daily.  He has now started to have right hip pain for which she has been placed on tramadol and is also using hemp salve.  For his ulnar neuropathy he wears an elbow splint occasionally but otherwise tries to keep his arm straight while sleeping at night.    Last visit-patient states he tried to cut back on his gabapentin to 300 mg twice a day but the pain in the bottoms of his feet increased therefore he has increased his dose again to 300 mg 3 times a day.  At times he forgets to take his afternoon dose.  For his left foot drop he cannot wear an AFO as he has a plantar wart which is extremely painful.  His podiatrist has been treating him for it.  For his ulnar neuropathy he had not been wearing his elbow splints but has just started back again.    Last visit-patient states his neuropathy symptoms are well controlled in his feet.  The symptoms are mainly aggravated when it is cold outside.   He continues to take gabapentin 300 mg in the morning and 600 mg at night.  He recently pulled a muscle in his right leg and has been on meloxicam in addition to methocarbamol for it.  Denies any low back pain.  Has been compliant with elbow splints in both arms at night.  He also had neck and upper back pain a few months ago.  That has subsided now but he continues to have some scalp tingling however denies having any headaches.      Last visit-patient states that he changed his podiatrist and got new insoles and also got a wart removed which has helped with the pain in his feet.  He also denies having any burning in his feet and only gets the symptoms when it is cold outside.  He continues to take gabapentin 300 mg in the morning and 600 mg at night.  With regards to his low back pain he got physical therapy which has helped.  Has been trying to do the exercises at home now.  For his ulnar neuropathy he has elbow braces but is not very compliant with them.  He does have occasional numbness in his hands.    Last visit-  patient states that he has again started to have difficulty walking as he is big toe on the left is pushing against his second toe.  He does not want to have any more surgeries on his feet due to the long recovery process.  The balls of his feet continue to hurt however the burning in his feet is well controlled with gabapentin 300 mg in the morning and 600 mg at night.  Currently his back pain is also under good control as he continues to do back exercises at home.  He is still taking vitamin B6 and B12 supplements.  With regards to his ulnar neuropathy he is still occasionally wearing elbow splints.    Last visit-patient states his symptoms of burning in the feet are well controlled with gabapentin 300 mg which he takes 1 in the morning and 2 at bedtime.  However according to his [facility] report patient has been taking it infrequently as he last picked up his gabapentin in July.  His back pain is  also under good control.  He has finished physical therapy and now does the exercises at home.  With regards to his mild to moderate bilateral ulnar neuropathy patient wears elbow splints at night although not every day.    Last visit- since his last visit patient has had surgery for his hammertoes but not for the bunions.  The pain in his feet has improved but he still gets burning pain in the balls of his feet with prolonged standing at work.  His low back pain has also improved as he has been getting physical therapy.  With regards to his ulnar neuropathy he typically wears the elbow splints only on one side.     Last visit-patient feels that the pain in his feet has improved after starting gabapentin 300 mg twice a day.  He does not want to go up on the dose any further yet.  He still has not had the surgery for his bunions and hammertoes and will be having it next month.  He continues to have mild low back pain and stiffness but no pain radiating down his legs.  With regards to his ulnar neuropathy he still has not started wearing an elbow brace.     Last visit- Since his last visit patient continues to do the same and is currently denying any back pain radiating down to the legs.  He does have pain at the bottom of his feet more so with prolonged standing that is not well controlled with CBD oil.  He had all of his workup including the neuropathy panel showed that his B6 and B12 was on the lower limit of normal, other labs were normal.  I had asked him to take supplements which she is taking.  He also had his MRI L spine that I personally reviewed the results of which is as follows -Grade 1 anterolisthesis of L5 on S1. Multilevel spondylitic changes greatest at the L4-L5 and L5-S1 levels. At the L4-L5 level, there is right far lateral predominance of disc bulge along with significant facet hypertrophy producing moderate to severe spinal canal stenosis greatest in the right lateral region with moderate to  severe right neuroforaminal stenosis and subarticular recess narrowing. At the L5-S1 level, there is moderate right and moderate to severe left neuroforaminal stenosis and left subarticular recess narrowing.  His EMG/NCS showed mild axonal peripheral neuropathy as well as multiple entrapment neuropathies including moderate left peroneal, mild to moderate bilateral ulnar and mild right median neuropathy.  As such there was no evidence of L5-S1 radiculopathy as the proximally innervated muscles in the legs were normal.  I have discussed the results of the MRI in detail with the patient however I feel he would not be a surgical candidate at this time as his back pain is mild with no radiation to the legs.  I did offer him a physical therapy referral but he has  been going for acupuncture for cervical radiculopathy and would like to try that first before trying PT.     Initial [id number]-old male with a history of hypertension, hyperlipidemia, recently diagnosed prostate cancer, cervical laminectomy was referred here by orthopedics for hyperreflexia on examination.  As per patient he had symptoms of tingling in both his arms along with dragging of his left leg and inability to have a penile erection back in 1980s.  After that he had an MRI of his cervical spine that had showed diffused vertebrae in his neck.  He postponed having surgery for several years but finally had a laminectomy at C3-4, C4-5 in 1990s.  After the surgery the tingling in his arms improved however he continued to drag his left leg and have sexual dysfunction.  He also started to have low back pain off and on about 10 years ago but he denies any radiation of the pain down his legs.  Denies any tingling or numbness in his feet but does feel extremely cold in his hands and feet and also has pain in the bottom of his feet on both sides that worsens with prolonged standing.  He was diagnosed with plantar fasciitis and was fitted with special insoles  which relieved his pain initially but that has restarted again now.  He also has hammertoes and bunions in his left foot and recently saw an orthopedic surgeon who felt he was a good surgical candidate however since he had hyperreflexia in his lower extremities on examination  she referred him here.  He denies having a history of diabetes but does drink alcohol every day for the past several years (either 2 glasses of wine or 2-3 beers every day).    The following portions of the patient's history were reviewed today and updated as of 09/24/2019  : allergies, current medications, past family history, past medical history, past social history, past surgical history and problem list  These document will be scanned to patient's chart.      Current Outpatient Medications:     ALBUTEROL SULFATE IN, Inhale As Needed., Disp: , Rfl:     Ascorbic Acid (VITAMIN C PO), Take  by mouth Daily., Disp: , Rfl:     CBD (cannabidiol) oral oil, Take  by mouth Daily., Disp: , Rfl:     Cholecalciferol (VITAMIN D3 PO), Take  by mouth Daily., Disp: , Rfl:     coenzyme Q10 100 MG capsule, Take 1 capsule by mouth Daily., Disp: , Rfl:     fluticasone (FLONASE) 50 MCG/ACT nasal spray, SHAKE AND INSTILL 2 SPRAYS INTO TIMES IN EACH NOSTRIL TWICE DAILY, Disp: , Rfl:     gabapentin (NEURONTIN) 300 MG capsule, Take 1 capsule by mouth 3 (Three) Times a Day., Disp: 270 capsule, Rfl: 1    lisinopril (PRINIVIL,ZESTRIL) 10 MG tablet, Take 1 tablet by mouth Daily., Disp: , Rfl: 0    Multiple Vitamin (MULTI-VITAMIN DAILY PO), Take  by mouth., Disp: , Rfl:     Omega-3 Fatty Acids (FISH OIL PO), Take  by mouth Daily., Disp: , Rfl:     rosuvastatin (CRESTOR) 5 MG tablet, rosuvastatin 5 mg tablet  Take 1 tablet every day by oral route., Disp: , Rfl:     tadalafil (CIALIS) 5 MG tablet, Take 1 tablet by mouth Daily., Disp: , Rfl:    Past Medical History:   Diagnosis Date    Cancer 2019?    Prostate    Difficulty walking 6/1985    Peripheral neuropathy 12/1995  "     Past Surgical History:   Procedure Laterality Date    BACK SURGERY      FOOT SURGERY      toe removed on left foot     HERNIA REPAIR      x2    KNEE SURGERY Right     Meniscal sx    LAMINECTOMY  89    SHOULDER SURGERY Left     Rotator Cuff      Family History   Problem Relation Age of Onset    Stroke Father         First in late . Last at death ’s    Dementia Mother         Alzheimer’s      Social History     Socioeconomic History    Marital status:    Tobacco Use    Smoking status: Former     Current packs/day: 0.00     Average packs/day: 2.0 packs/day for 12.0 years (24.0 ttl pk-yrs)     Types: Cigarettes, Pipe     Start date: 1968     Quit date: 1980     Years since quittin.6     Passive exposure: Past    Smokeless tobacco: Never   Vaping Use    Vaping status: Never Used   Substance and Sexual Activity    Alcohol use: Yes     Alcohol/week: 8.0 standard drinks of alcohol     Types: 6 Glasses of wine, 2 Shots of liquor per week     Comment: 0-3 daily    Drug use: No    Sexual activity: Not Currently     Partners: Female     Birth control/protection: Condom, Coitus interruptus     Review of Systems   Musculoskeletal:  Positive for gait problem.   All other systems reviewed and are negative.      Objective:    /70   Pulse 78   Ht 175.3 cm (69\")   Wt 73.9 kg (163 lb)   SpO2 97%   BMI 24.07 kg/m²     Neurology Exam:    General apperance: NAD.     Mental status: Alert, awake and oriented to time place and person.    Recent and Remote memory: Intact.    Attention span and Concentration: Normal.     Language and Speech: Intact- No dysarthria.    Fluency, Naming , Repitition and Comprehension:  Intact    Cranial Nerves:   CN II: Visual fields are full. Intact. Fundi - Normal, No papillederma, Pupils - CHAIM  CN III, IV and VI: Extraocular movements are intact. Normal saccades.   CN V: Facial sensation is intact.   CN VII: Muscles of facial expression reveal no asymmetry. " Intact.   CN VIII: Hearing is intact. Whispered voice intact.   CN IX and X: Palate elevates symmetrically. Intact  CN XI: Shoulder shrug is intact.   CN XII: Tongue is midline without evidence of atrophy or fasciculation.     Ophthalmoscopic exam of optic disc-normal    Motor: No tremors noted.  Small handwriting but it was the same throughout    Right UE muscle strength 5/5. Normal tone.     Left UE muscle strength 5/5. Normal tone.      Right LE muscle strength5/5. Normal tone.     Left LE muscle strength 5/5.  Plantarflexion and dorsiflexion-4/5, Eversion and inversion-4/5, Normal tone.      Sensory: Normal light touch and pinprick sensation bilaterally.  Impaired vibration in both upper and lower extremities    DTRs: 2+ bilaterally in upper and 3+ in b/l lower extremities.  No clonus     Babinski: Negative bilaterally.    Co-ordination: Normal finger-to-nose, heel to shin B/L.    Rhomberg: Negative.    Gait: drags his left foot slightly while walking with pelvic tilt to the right.  No shuffling or festinating gait with normal arm swing.  Previously-scissoring gait with his left foot was also plantarflexed while walking.     Cardiovascular: Regular rate and rhythm without murmur, gallop or rub.    Assessment and Plan:  1. Neuropathy  EMG/NCS showed mild peripheral neuropathy and moderate left peroneal neuropathy.    He should continue gabapentin 300 mg, 2-3 times a day.   I have counseled him on fall precautions.  He should use a cane at all times    2.  Ulnar neuropathy of both upper extremities  He should continue wearing elbow splints EMG/NCS showed mild to moderate ulnar neuropathy bilaterally    3.  Balance disorder  Patient's balance disorder is multifactorial in nature due to limb length discrepancy, degenerative disc disease in the lumbar spine, neuropathy, previous hammertoe surgery  I do not see features of Parkinson's on examination today  I have told him to continue PT    Return in about 1 year  (around 8/15/2025).         Brook Huang MD

## 2024-10-09 ENCOUNTER — TELEPHONE (OUTPATIENT)
Dept: NEUROLOGY | Facility: CLINIC | Age: 74
End: 2024-10-09
Payer: MEDICARE

## 2024-10-09 NOTE — TELEPHONE ENCOUNTER
Caller: Ion Borges    Relationship: SELF    Best call back number:  Telephone Information:   Mobile [phone]         What form or medical record are you requesting: EMG     Who is requesting this form or medical record from you: Ramirez Rose, DO  Physical Medicine and Rehabilitation  NPI: [id number]  [address] [facility] [address]  [facility] [address]     Phone: [phone]  Fax: [phone]      Additional notes: PT IS REQUESTING THE EMG  FROM 11- BE FAXED TO THE ABOVE PROVIDER

## 2024-10-28 ENCOUNTER — TRANSCRIBE ORDERS (OUTPATIENT)
Dept: ADMINISTRATIVE | Facility: HOSPITAL | Age: 74
End: 2024-10-28
Payer: MEDICARE

## 2024-10-28 ENCOUNTER — HOSPITAL ENCOUNTER (OUTPATIENT)
Dept: GENERAL RADIOLOGY | Facility: HOSPITAL | Age: 74
Discharge: HOME | End: 2024-10-28
Admitting: INTERNAL MEDICINE
Payer: MEDICARE

## 2024-10-28 DIAGNOSIS — M79.606 PAIN OF LOWER EXTREMITY, UNSPECIFIED LATERALITY: ICD-10-CM

## 2024-10-28 DIAGNOSIS — R53.1 WEAKNESS: Primary | ICD-10-CM

## 2024-10-28 PROCEDURE — 72050 X-RAY EXAM NECK SPINE 4/5VWS: CPT

## 2024-11-01 ENCOUNTER — TRANSCRIBE ORDERS (OUTPATIENT)
Dept: ADMINISTRATIVE | Facility: HOSPITAL | Age: 74
End: 2024-11-01
Payer: MEDICARE

## 2024-11-01 DIAGNOSIS — M62.81: Primary | ICD-10-CM

## 2024-11-11 ENCOUNTER — HOSPITAL ENCOUNTER (OUTPATIENT)
Facility: HOSPITAL | Age: 74
Discharge: HOME | End: 2024-11-11
Admitting: INTERNAL MEDICINE
Payer: MEDICARE

## 2024-11-11 DIAGNOSIS — M62.81: ICD-10-CM

## 2024-11-11 PROCEDURE — 72141 MRI NECK SPINE W/O DYE: CPT

## 2024-12-30 DIAGNOSIS — G62.9 NEUROPATHY: ICD-10-CM

## 2024-12-30 RX ORDER — GABAPENTIN 300 MG/1
300 CAPSULE ORAL 3 TIMES DAILY
Qty: 270 CAPSULE | Refills: 0 | Status: SHIPPED | OUTPATIENT
Start: 2024-12-30

## 2024-12-30 NOTE — TELEPHONE ENCOUNTER
Rx Refill Note  Requested Prescriptions     Pending Prescriptions Disp Refills    gabapentin (NEURONTIN) 300 MG capsule 270 capsule 1     Sig: Take 1 capsule by mouth 3 (Three) Times a Day.      Last filled: 8/15/24 for 6 mos total    Last office visit with prescribing clinician: 8/15/2024      Next office visit with prescribing clinician: 8/18/2025     Wisam Ceballos MA  12/30/24, 16:12 EST

## 2024-12-30 NOTE — TELEPHONE ENCOUNTER
"    Caller: OlgaedelmiraIon \"JAREK\"    Relationship: Self    Best call back number:   Telephone Information:   Mobile [phone]         Requested Prescriptions:   Requested Prescriptions     Pending Prescriptions Disp Refills    gabapentin (NEURONTIN) 300 MG capsule 270 capsule 1     Sig: Take 1 capsule by mouth 3 (Three) Times a Day.        Pharmacy where request should be sent: [facility] [address] [phone] [facility] [phone]      Last office visit with prescribing clinician: 8/15/2024   Last telemedicine visit with prescribing clinician: Visit date not found   Next office visit with prescribing clinician: 8/18/2025     Additional details provided by patient: PT CURRENTLY HAS ABOUT TWOS WEEKS AND IT TAKES TWO WEEKS TO RECEIVE MEDICATION AS WELL.     Does the patient have less than a 3 day supply:  [] Yes  [x] No    Would you like a call back once the refill request has been completed: [] Yes [x] No    If the office needs to give you a call back, can they leave a voicemail: [] Yes [x] No    Ashley Reyna Rep   12/30/24 12:35 EST     "

## 2025-02-24 ENCOUNTER — HOSPITAL ENCOUNTER (OUTPATIENT)
Dept: GENERAL RADIOLOGY | Facility: HOSPITAL | Age: 75
Discharge: HOME | End: 2025-02-24
Payer: MEDICARE

## 2025-02-24 ENCOUNTER — TRANSCRIBE ORDERS (OUTPATIENT)
Dept: ADMINISTRATIVE | Facility: HOSPITAL | Age: 75
End: 2025-02-24
Payer: MEDICARE

## 2025-02-24 DIAGNOSIS — M25.551 PAIN IN RIGHT HIP: ICD-10-CM

## 2025-02-24 DIAGNOSIS — R52 PAIN: Primary | ICD-10-CM

## 2025-02-24 DIAGNOSIS — M25.561 CHRONIC PAIN OF RIGHT KNEE: Primary | ICD-10-CM

## 2025-02-24 DIAGNOSIS — G89.29 CHRONIC PAIN OF RIGHT KNEE: Primary | ICD-10-CM

## 2025-02-24 DIAGNOSIS — M54.10 RADICULOPATHY, UNSPECIFIED SPINAL REGION: ICD-10-CM

## 2025-02-24 PROCEDURE — 72110 X-RAY EXAM L-2 SPINE 4/>VWS: CPT

## 2025-02-24 PROCEDURE — 73502 X-RAY EXAM HIP UNI 2-3 VIEWS: CPT

## 2025-02-24 PROCEDURE — 73562 X-RAY EXAM OF KNEE 3: CPT

## 2025-02-26 ENCOUNTER — TRANSCRIBE ORDERS (OUTPATIENT)
Dept: ADMINISTRATIVE | Facility: HOSPITAL | Age: 75
End: 2025-02-26
Payer: MEDICARE

## 2025-02-26 DIAGNOSIS — M48.062 PSEUDOCLAUDICATION: Primary | ICD-10-CM

## 2025-04-02 ENCOUNTER — TRANSCRIBE ORDERS (OUTPATIENT)
Dept: ADMINISTRATIVE | Facility: HOSPITAL | Age: 75
End: 2025-04-02
Payer: MEDICARE

## 2025-04-02 DIAGNOSIS — G45.3 AMAUROSIS FUGAX: Primary | ICD-10-CM

## 2025-05-12 ENCOUNTER — TELEPHONE (OUTPATIENT)
Dept: NEUROLOGY | Facility: CLINIC | Age: 75
End: 2025-05-12
Payer: MEDICARE

## 2025-05-12 DIAGNOSIS — G62.9 NEUROPATHY: ICD-10-CM

## 2025-05-12 NOTE — TELEPHONE ENCOUNTER
Rx Refill Note  Requested Prescriptions     Pending Prescriptions Disp Refills    gabapentin (NEURONTIN) 300 MG capsule 270 capsule 0     Sig: Take 1 capsule by mouth 3 (Three) Times a Day.      Last filled: 12/30/2024 90 days, 0 refills  Last office visit with prescribing clinician: 08/15/2024  Next office visit with prescribing clinician: 08/18/2025    Kristen Lyman RN  05/12/25, 16:46 EDT    Pended

## 2025-05-12 NOTE — TELEPHONE ENCOUNTER
URGENT    Medication requested (name and dose):      gabapentin (NEURONTIN) 300 MG capsule   Take 1 capsule by mouth 3 (Three) Times a Day     Pharmacy where request should be sent:      [facility] - [address] [phone] [facility] [phone]      Additional details provided by patient:  PATIENT BELIEVES A NEW PRESCRIPTION NEEDS TO ACCOMPANY THIS REQUEST    Best call back number:  [phone]    Does the patient have less than a 3 day supply:  [x] Yes  [] No    Ashley Lambert Rep  05/12/25, 15:21 EDT

## 2025-05-13 RX ORDER — GABAPENTIN 300 MG/1
300 CAPSULE ORAL 3 TIMES DAILY
Qty: 270 CAPSULE | Refills: 0 | Status: SHIPPED | OUTPATIENT
Start: 2025-05-13

## 2025-05-27 ENCOUNTER — TELEPHONE (OUTPATIENT)
Dept: NEUROLOGY | Facility: CLINIC | Age: 75
End: 2025-05-27
Payer: MEDICARE

## 2025-05-27 DIAGNOSIS — G62.9 NEUROPATHY: ICD-10-CM

## 2025-05-27 RX ORDER — GABAPENTIN 300 MG/1
300 CAPSULE ORAL 3 TIMES DAILY
Qty: 270 CAPSULE | Refills: 0 | Status: SHIPPED | OUTPATIENT
Start: 2025-05-27

## 2025-05-27 NOTE — TELEPHONE ENCOUNTER
Rx Refill Note  Requested Prescriptions     Pending Prescriptions Disp Refills    gabapentin (NEURONTIN) 300 MG capsule 270 capsule 0     Sig: Take 1 capsule by mouth 3 (Three) Times a Day.      Last filled: 5/13/2025 90 days, 0 refills Pended  Last office visit with prescribing clinician: 8/15/2024      Next office visit with prescribing clinician: 8/18/2025     Kristen Lyman RN  05/27/25, 11:01 EDT      Needs to be sent to [facility].

## 2025-05-27 NOTE — TELEPHONE ENCOUNTER
PATIENT SAYS REFILL REQUEST FOR gabapentin (NEURONTIN) 300 MG capsule     WAS S[facility] TO WRONG PHARMACY.  [id number] [place], [place] - [address] AT [facility] [address] [facility] [phone] [facility] [phone] FX     AND SHOULD BE GOING TO  [facility]:    [facility] - [place], [place] - [address] [place] [address] - [phone] [facility] [phone]    [address] [facility] [address]   Phone: [phone] Fax: [phone]   Hours: Not open 24 hours       PLEASE CANCEL AT [facility] AND SEND TO [facility]    THANK YOU

## 2025-08-05 ENCOUNTER — TRANSCRIBE ORDERS (OUTPATIENT)
Dept: ADMINISTRATIVE | Facility: HOSPITAL | Age: 75
End: 2025-08-05
Payer: MEDICARE

## 2025-08-05 DIAGNOSIS — R93.1 ABNORMAL FINDINGS ON DIAGNOSTIC IMAGING OF HEART/CORONARY CIRCULATION: Primary | ICD-10-CM

## 2025-08-17 ENCOUNTER — HOSPITAL ENCOUNTER (OUTPATIENT)
Facility: HOSPITAL | Age: 75
Discharge: HOME | End: 2025-08-17
Admitting: INTERNAL MEDICINE
Payer: MEDICARE

## 2025-08-17 DIAGNOSIS — R93.1 ABNORMAL FINDINGS ON DIAGNOSTIC IMAGING OF HEART/CORONARY CIRCULATION: ICD-10-CM

## 2025-08-17 LAB — CREAT BLDA-MCNC: 1 MG/DL (ref 0.6–1.3)

## 2025-08-17 PROCEDURE — 70548 MR ANGIOGRAPHY NECK W/DYE: CPT

## 2025-08-17 PROCEDURE — 25510000002 GADOBENATE DIMEGLUMINE 529 MG/ML SOLUTION: Performed by: INTERNAL MEDICINE

## 2025-08-17 PROCEDURE — A9577 INJ MULTIHANCE: HCPCS | Performed by: INTERNAL MEDICINE

## 2025-08-17 PROCEDURE — 82565 ASSAY OF CREATININE: CPT

## 2025-08-17 RX ADMIN — GADOBENATE DIMEGLUMINE 15 ML: 529 INJECTION, SOLUTION INTRAVENOUS at 18:04

## 2025-08-18 ENCOUNTER — OFFICE VISIT (OUTPATIENT)
Dept: NEUROLOGY | Facility: CLINIC | Age: 75
End: 2025-08-18
Payer: MEDICARE

## 2025-08-18 ENCOUNTER — TELEPHONE (OUTPATIENT)
Dept: NEUROLOGY | Facility: CLINIC | Age: 75
End: 2025-08-18

## 2025-08-18 ENCOUNTER — LAB (OUTPATIENT)
Dept: LAB | Facility: HOSPITAL | Age: 75
End: 2025-08-18
Payer: MEDICARE

## 2025-08-18 VITALS
BODY MASS INDEX: 23.4 KG/M2 | DIASTOLIC BLOOD PRESSURE: 70 MMHG | HEIGHT: 69 IN | HEART RATE: 67 BPM | SYSTOLIC BLOOD PRESSURE: 110 MMHG | WEIGHT: 158 LBS

## 2025-08-18 DIAGNOSIS — G56.23 ULNAR NEUROPATHY OF BOTH UPPER EXTREMITIES: ICD-10-CM

## 2025-08-18 DIAGNOSIS — R26.89 BALANCE DISORDER: ICD-10-CM

## 2025-08-18 DIAGNOSIS — G62.9 NEUROPATHY: ICD-10-CM

## 2025-08-18 DIAGNOSIS — G62.9 NEUROPATHY: Primary | ICD-10-CM

## 2025-08-18 PROCEDURE — 80048 BASIC METABOLIC PNL TOTAL CA: CPT

## 2025-08-18 PROCEDURE — 82550 ASSAY OF CK (CPK): CPT

## 2025-08-18 PROCEDURE — 99214 OFFICE O/P EST MOD 30 MIN: CPT | Performed by: PSYCHIATRY & NEUROLOGY

## 2025-08-18 PROCEDURE — 82607 VITAMIN B-12: CPT

## 2025-08-18 PROCEDURE — 36415 COLL VENOUS BLD VENIPUNCTURE: CPT

## 2025-08-18 RX ORDER — GABAPENTIN 300 MG/1
300 CAPSULE ORAL 3 TIMES DAILY
Qty: 270 CAPSULE | Refills: 1 | Status: SHIPPED | OUTPATIENT
Start: 2025-08-18

## 2025-08-19 LAB
ANION GAP SERPL CALCULATED.3IONS-SCNC: 14.3 MMOL/L (ref 5–15)
BUN SERPL-MCNC: 24 MG/DL (ref 8–23)
BUN/CREAT SERPL: 28.2 (ref 7–25)
CALCIUM SPEC-SCNC: 10.6 MG/DL (ref 8.6–10.5)
CHLORIDE SERPL-SCNC: 106 MMOL/L (ref 98–107)
CK SERPL-CCNC: 331 U/L (ref 20–200)
CO2 SERPL-SCNC: 20.7 MMOL/L (ref 22–29)
CREAT SERPL-MCNC: 0.85 MG/DL (ref 0.76–1.27)
EGFRCR SERPLBLD CKD-EPI 2021: 91.2 ML/MIN/1.73
GLUCOSE SERPL-MCNC: 84 MG/DL (ref 65–99)
POTASSIUM SERPL-SCNC: 4.6 MMOL/L (ref 3.5–5.2)
SODIUM SERPL-SCNC: 141 MMOL/L (ref 136–145)
VIT B12 BLD-MCNC: 608 PG/ML (ref 211–946)